# Patient Record
Sex: FEMALE | Race: WHITE | Employment: STUDENT | URBAN - METROPOLITAN AREA
[De-identification: names, ages, dates, MRNs, and addresses within clinical notes are randomized per-mention and may not be internally consistent; named-entity substitution may affect disease eponyms.]

---

## 2017-01-09 ENCOUNTER — OFFICE VISIT (OUTPATIENT)
Dept: SURGERY | Age: 32
End: 2017-01-09

## 2017-01-09 VITALS
OXYGEN SATURATION: 98 % | BODY MASS INDEX: 43.4 KG/M2 | HEART RATE: 87 BPM | HEIGHT: 69 IN | WEIGHT: 293 LBS | DIASTOLIC BLOOD PRESSURE: 76 MMHG | RESPIRATION RATE: 18 BRPM | SYSTOLIC BLOOD PRESSURE: 142 MMHG

## 2017-01-09 DIAGNOSIS — N39.3 SUI (STRESS URINARY INCONTINENCE, FEMALE): ICD-10-CM

## 2017-01-09 DIAGNOSIS — N92.6 IRREGULAR MENSES: ICD-10-CM

## 2017-01-09 DIAGNOSIS — E03.9 HYPOTHYROIDISM, UNSPECIFIED TYPE: ICD-10-CM

## 2017-01-09 DIAGNOSIS — M25.561 ARTHRALGIA OF BOTH KNEES: ICD-10-CM

## 2017-01-09 DIAGNOSIS — E66.01 MORBID OBESITY WITH BMI OF 50.0-59.9, ADULT (HCC): Primary | ICD-10-CM

## 2017-01-09 DIAGNOSIS — F41.9 ANXIETY: ICD-10-CM

## 2017-01-09 DIAGNOSIS — E66.01 MORBID OBESITY, UNSPECIFIED OBESITY TYPE (HCC): ICD-10-CM

## 2017-01-09 DIAGNOSIS — M25.562 ARTHRALGIA OF BOTH KNEES: ICD-10-CM

## 2017-01-09 DIAGNOSIS — R06.83 SNORES: ICD-10-CM

## 2017-01-09 RX ORDER — CITALOPRAM 40 MG/1
40 TABLET, FILM COATED ORAL EVERY EVENING
Status: ON HOLD | COMMUNITY
End: 2020-01-08

## 2017-01-09 NOTE — PROGRESS NOTES
Bariatric Surgery Consultation    Subjective: The patient is a 36 y.o. obese female with a Body mass index is 54.79 kg/(m^2). Giulia Umana The patient is currently her heaviest weight for the past 2 years. she has been overweight since childhood. she has been considering surgery since starting [facility] summer of 2016. she desires surgery at this time because of multiple health concerns and their lifestyle issues which are hindered by their weight. she has been referred by his family physician for evaluation and treatment of their obesity via surgical intervention. Randal Herman has tried multiple diets in her lifetime most recently tried physician supervised, behavior modification and unsupervised diets    Bariatric comorbidities present are   Patient Active Problem List   Diagnosis Code    Morbid obesity with BMI of 50.0-59.9, adult ([address] E66.01, Z68.43    Morbid obesity ([address] E66.01    Hypothyroidism E03.9    Anxiety F41.9    EVI (stress urinary incontinence, female) N39.3    Snores R06.83    Irregular menses N92.6    Arthritic-like pain M25.50       The patient is considering laparoscopic gastric bypass surgery for surgical weight loss due to their ineffective progress with medical forms of weight loss and the urging of their physician who cares for their primary medical issues. The patient  now presents  for consideration for weight loss surgery understanding the benefits of this over a medical approach of weight loss as was discussed in our presentation on weight loss surgery. They have discussed their plans both with their family and primary care physician who is in support of their pursuit of such. The patient has had no health issues as of late and denies and gastrointestinal disturbances other than what is outlined below in their review of symptoms.     The patients goal weight is 182 lb. (this represents a BMI of 27)    These goals are not typically consistent with expected outcomes of their desired operation and she understands that a multiyear dedication to diet and exercise are required to achieve this goal.  her Medical goals are resolution of these health issues. Patient Active Problem List    Diagnosis Date Noted    Morbid obesity with BMI of 50.0-59.9, adult ([address]     Morbid obesity ([facility] [address]     Hypothyroidism     Anxiety     EVI (stress urinary incontinence, female)     Snores     Irregular menses     Arthritic-like pain       Past Surgical History   Procedure Laterality Date    Hx cholecystectomy      Dilation and curettage       X 2 both for miscarriage      Social History   Substance Use Topics    Smoking status: Never Smoker    Smokeless tobacco: Not on file    Alcohol use Yes      Family History   Problem Relation Age of Onset    Kidney Disease Father     Cancer Father       Current Outpatient Prescriptions   Medication Sig Dispense Refill    citalopram (CELEXA) 40 mg tablet Take 40 mg by mouth daily.  LEVOTHYROXINE SODIUM (SYNTHROID PO) Take  by mouth.        No Known Allergies       Review of Systems:        General - No history or complaints of unexpected fever, chills, or weight loss  Head/Neck - No history or complaints of headache, diplopia, dysphagia, hearing loss  Cardiac - No history or complaints of chest pain, palpitations, murmur, or shortness of breath  Pulmonary - No history or complaints of shortness of breath, productive cough, hemoptysis  Gastrointestinal - no reflux,no  abdominal pain, obstipation/constipation or blood per rectum  Genitourinary - No history or complaints of hematuria/dysuria, or renal lithiasis  Musculoskeletal - moderate joint pain in their knee,  no muscular weakness  Hematologic - No history or complaints of bleeding disorders,  No blood transfusions  Neurologic - No history or complaints of  migraine headaches, seizure activity, syncopal episodes, TIA or stroke  Integumentary - No history or complaints of rashes, abnormal nevi, skin cancer  Gynecological - irregular menses    Objective:     Visit Vitals    /76 (BP 1 Location: Left arm, BP Patient Position: Sitting)    Pulse 87    Resp 18    Ht 5' 9\" (1.753 m)    Wt (!) 168.3 kg (371 lb)    SpO2 98%    Breastfeeding Unknown    BMI 54.79 kg/m2       Physical Examination: General appearance - alert, well appearing, and in no distress and oriented to person, place, and time  Mental status - alert, oriented to person, place, and time, normal mood, behavior, speech, dress, motor activity, and thought processes  Eyes - pupils equal and reactive, extraocular eye movements intact, sclera anicteric, left eye normal, right eye normal  Ears - bilateral TM's and external ear canals normal, right ear normal, left ear normal  Nose - normal and patent, no erythema, discharge or polyps and normal nontender sinuses  Mouth - mucous membranes moist, pharynx normal without lesions  Neck - supple, no significant adenopathy  Lymphatics - no palpable lymphadenopathy, no hepatosplenomegaly  Chest - clear to auscultation, no wheezes, rales or rhonchi, symmetric air entry  Heart - normal rate, regular rhythm, normal S1, S2, no murmurs, rubs, clicks or gallops  Abdomen - soft, nontender, nondistended, no masses or organomegaly  Back exam - full range of motion, no tenderness, palpable spasm or pain on motion  Neurological - alert, oriented, normal speech, no focal findings or movement disorder noted  Musculoskeletal - no joint tenderness, deformity or swelling  Extremities - peripheral pulses normal, no pedal edema, no clubbing or cyanosis  Skin - normal coloration and turgor, no rashes, no suspicious skin lesions noted    Labs:     No results found for this or any previous visit (from the past 1008 hour(s)). Assessment:     Morbid obesity with associated comorbidity    Plan:     laparoscopic gastric bypass surgery    This is a 36 y.o. female with a BMI of Body mass index is 54.79 kg/(m^2). and the weight-related co-morbidties of (as above). Viviana Hendricks meets the NIH criteria for bariatric surgery based upon the BMI of Body mass index is 54.79 kg/(m^2). and multiple weight-related co-morbidties. Viviana Hendricks has elected laparoscopic gastric bypass as her intervention of choice for treatment of morbid obestiy through surgical means secondary to its uniform results,  profound baseline suppression of hunger and pace at which weight is lost.    In the office today, following Eula's history and physical examination, a 30 minute discussion regarding the anatomic alterations for the laparoscopic gastric bypass  was undertaken. The dietary expectations and the patient  dependent factors for success were thoroughly discussed, to include the need for interval follow-up and long-term dietary changes associated with success. The possible short and long term  complications of the gastric bypass were also discussed, to include but not limited to;death, DVT/PE, staple line leak, bleeding, stricture formation, infection,internal hernia  and pouch dilation. Specific weight related outcomes for success were also discussed with an emphasis on careful and close follow-up with the first year and dietary behavior modification over the first years as baseline cyclical hunger returns  The patient expressed an understanding of the above factors, and her questions were answered in their entirety. In addition, the patient attended a 1.5 hour power point seminar regarding obesity, surgical weight loss including, adjustable gastric band, gastric bypass, and sleeve gastrectomy. This discussion contrasted the different surgical techniques, mechanisms of actions and expected outcomes, and surgical and medical risks associated with each procedure. During this seminar, there was a long question and answer session where each questions was answered until there were no additional questions.      Today, the patient had all of her questions answered and desires to proceed with  bariatric surgery initially choosing the gastric bypass as her surgical option. The patient understands the plan of action    She has completed her dietary visits via [employer] and has her psychological evaluation later this week    She takes no steroids and does not smoke    Secondary Diagnoses:     Dietary Intervention  - The patient is currently scheduled to see or has been followed by a bariatric nutritionist for an attempt at preoperative weight loss as has been dictated by their insurance carrier. They will be assessed at various times during their follow up to evaluate their progress depending on the length of time that is required once again by their carrier. I have explained the importance of preoperative weight loss and the benefits regarding lower surgical risk and also assisting the patient in reaching their weight loss goal.  Finally they understand their is a physiologic benefit from the standpoint of hepatic volume reduction preoperatively. I have reiterated the importance of a low carbohydrate and high protein regimen to achieve their stated goal.    Weight Related Arthritis -The patient understands the benefits that weight loss surgery can have on their arthritis but also understands that weight loss is not a guaranteed cure and relief of symptoms is often dependent on the severity of the underlying disease.  The patient also understands that traditional pharmaceutical treatments for this diagnosis are usually unavailable to post-operative weight loss patients due to the effects on the gastrointestinal tract particularly with the gastric bypass and to a lesser effect with the sleeve gastrectomy.  Any changes to the patients medication treatment will ultimately be made the patients PCP with input by our office.       Signed By: Dean Epstein MD     January 9, 2017

## 2017-01-09 NOTE — PATIENT INSTRUCTIONS
Body Mass Index: Care Instructions  Your Care Instructions    Body mass index (BMI) can help you see if your weight is raising your risk for health problems. It uses a formula to compare how much you weigh with how tall you are. A BMI between 18.5 and 24.9 is considered healthy. A BMI between 25 and 29.9 is considered overweight. A BMI of 30 or higher is considered obese. If your BMI is in the normal range, it means that you have a lower risk for weight-related health problems. If your BMI is in the overweight or obese range, you may be at increased risk for weight-related health problems, such as high blood pressure, heart disease, stroke, arthritis or joint pain, and diabetes. BMI is just one measure of your risk for weight-related health problems. You may be at higher risk for health problems if you are not active, you eat an unhealthy diet, or you drink too much alcohol or use tobacco products. Follow-up care is a key part of your treatment and safety. Be sure to make and go to all appointments, and call your doctor if you are having problems. It's also a good idea to know your test results and keep a list of the medicines you take. How can you care for yourself at home? · Practice healthy eating habits. This includes eating plenty of fruits, vegetables, whole grains, lean protein, and low-fat dairy. · Get at least 30 minutes of exercise 5 days a week or more. Brisk walking is a good choice. You also may want to do other activities, such as running, swimming, cycling, or playing tennis or team sports. · Do not smoke. Smoking can increase your risk for health problems. If you need help quitting, talk to your doctor about stop-smoking programs and medicines. These can increase your chances of quitting for good. · Limit alcohol to 2 drinks a day for men and 1 drink a day for women. Too much alcohol can cause health problems.   If you have a BMI higher than 25  · Your doctor may do other tests to check your risk for weight-related health problems. This may include measuring the distance around your waist. A waist measurement of more than 40 inches in men or 35 inches in women can increase the risk of weight-related health problems. · Talk with your doctor about steps you can take to stay healthy or improve your health. You may need to make lifestyle changes to lose weight and stay healthy, such as changing your diet and getting regular exercise. Where can you learn more? Go to http://cristina-melodie.info/. Enter S176 in the search box to learn more about \"Body Mass Index: Care Instructions. \"  Current as of: February 16, 2016  Content Version: 11.1  © 9429-9663 [employer]. Care instructions adapted under license by [employer] (which disclaims liability or warranty for this information). If you have questions about a medical condition or this instruction, always ask your healthcare professional. [place] [address] any warranty or liability for your use of this information. New patient Instructions      1. Ensure all pre-operative insurances requirements are complete (ie; dietary visits, psychology consults, primary care documentation, etc)    2. Adhere to pre-operative weight loss / weight maintenance plan discussed in the office today. 3. Contact the office with any questions on pre-operative clearance issues (ie; cardiology work-up, pulmonary work-up, upper GI study, etc). 4. If a barium upper GI study has been ordered for your evaluation, make sure you are on liquids only the morning of the procedure.

## 2017-01-20 RX ORDER — OXYCODONE AND ACETAMINOPHEN 5; 325 MG/1; MG/1
1 TABLET ORAL
Qty: 30 TAB | Refills: 0 | Status: SHIPPED | OUTPATIENT
Start: 2017-01-20 | End: 2017-02-15

## 2017-01-23 ENCOUNTER — NURSE NAVIGATOR (OUTPATIENT)
Dept: SURGERY | Age: 32
End: 2017-01-23

## 2017-01-23 ENCOUNTER — OFFICE VISIT (OUTPATIENT)
Dept: SURGERY | Age: 32
End: 2017-01-23

## 2017-01-23 ENCOUNTER — HOSPITAL ENCOUNTER (OUTPATIENT)
Dept: PREADMISSION TESTING | Age: 32
Discharge: HOME | End: 2017-01-23
Attending: SPECIALIST
Payer: MEDICAID

## 2017-01-23 VITALS
OXYGEN SATURATION: 98 % | RESPIRATION RATE: 16 BRPM | DIASTOLIC BLOOD PRESSURE: 74 MMHG | HEART RATE: 100 BPM | SYSTOLIC BLOOD PRESSURE: 127 MMHG | WEIGHT: 293 LBS | BODY MASS INDEX: 43.4 KG/M2 | HEIGHT: 69 IN

## 2017-01-23 DIAGNOSIS — E66.01 MORBID OBESITY WITH BMI OF 50.0-59.9, ADULT (HCC): Primary | ICD-10-CM

## 2017-01-23 DIAGNOSIS — E03.9 HYPOTHYROIDISM, UNSPECIFIED TYPE: ICD-10-CM

## 2017-01-23 DIAGNOSIS — R06.83 SNORES: ICD-10-CM

## 2017-01-23 DIAGNOSIS — E66.01 MORBID OBESITY, UNSPECIFIED OBESITY TYPE (HCC): ICD-10-CM

## 2017-01-23 DIAGNOSIS — N39.3 SUI (STRESS URINARY INCONTINENCE, FEMALE): ICD-10-CM

## 2017-01-23 DIAGNOSIS — N92.6 IRREGULAR MENSES: ICD-10-CM

## 2017-01-23 DIAGNOSIS — M25.561 ARTHRALGIA OF BOTH KNEES: ICD-10-CM

## 2017-01-23 DIAGNOSIS — M25.562 ARTHRALGIA OF BOTH KNEES: ICD-10-CM

## 2017-01-23 DIAGNOSIS — F41.9 ANXIETY: ICD-10-CM

## 2017-01-23 LAB
ABO + RH BLD: NORMAL
ALBUMIN SERPL BCP-MCNC: 3.7 G/DL (ref 3.4–5)
ALBUMIN/GLOB SERPL: 0.9 {RATIO} (ref 0.8–1.7)
ALP SERPL-CCNC: 45 U/L (ref 45–117)
ALT SERPL-CCNC: 24 U/L (ref 13–56)
ANION GAP BLD CALC-SCNC: 9 MMOL/L (ref 3–18)
AST SERPL W P-5'-P-CCNC: 16 U/L (ref 15–37)
ATRIAL RATE: 66 BPM
BASOPHILS # BLD AUTO: 0 K/UL (ref 0–0.06)
BASOPHILS # BLD: 0 % (ref 0–2)
BILIRUB SERPL-MCNC: 0.3 MG/DL (ref 0.2–1)
BLOOD GROUP ANTIBODIES SERPL: NORMAL
BUN SERPL-MCNC: 11 MG/DL (ref 7–18)
BUN/CREAT SERPL: 15 (ref 12–20)
CALCIUM SERPL-MCNC: 8.8 MG/DL (ref 8.5–10.1)
CALCULATED P AXIS, ECG09: 63 DEGREES
CALCULATED R AXIS, ECG10: 74 DEGREES
CALCULATED T AXIS, ECG11: 57 DEGREES
CHLORIDE SERPL-SCNC: 103 MMOL/L (ref 100–108)
CO2 SERPL-SCNC: 28 MMOL/L (ref 21–32)
CREAT SERPL-MCNC: 0.75 MG/DL (ref 0.6–1.3)
DIAGNOSIS, 93000: NORMAL
DIFFERENTIAL METHOD BLD: NORMAL
EOSINOPHIL # BLD: 0.2 K/UL (ref 0–0.4)
EOSINOPHIL NFR BLD: 2 % (ref 0–5)
ERYTHROCYTE [DISTWIDTH] IN BLOOD BY AUTOMATED COUNT: 13.4 % (ref 11.6–14.5)
GLOBULIN SER CALC-MCNC: 3.9 G/DL (ref 2–4)
GLUCOSE SERPL-MCNC: 84 MG/DL (ref 74–99)
HCT VFR BLD AUTO: 40.7 % (ref 35–45)
HGB BLD-MCNC: 13.3 G/DL (ref 12–16)
LYMPHOCYTES # BLD AUTO: 34 % (ref 21–52)
LYMPHOCYTES # BLD: 3.4 K/UL (ref 0.9–3.6)
MCH RBC QN AUTO: 28 PG (ref 24–34)
MCHC RBC AUTO-ENTMCNC: 32.7 G/DL (ref 31–37)
MCV RBC AUTO: 85.7 FL (ref 74–97)
MONOCYTES # BLD: 0.9 K/UL (ref 0.05–1.2)
MONOCYTES NFR BLD AUTO: 9 % (ref 3–10)
NEUTS SEG # BLD: 5.6 K/UL (ref 1.8–8)
NEUTS SEG NFR BLD AUTO: 55 % (ref 40–73)
P-R INTERVAL, ECG05: 168 MS
PLATELET # BLD AUTO: 292 K/UL (ref 135–420)
PMV BLD AUTO: 9.9 FL (ref 9.2–11.8)
POTASSIUM SERPL-SCNC: 3.9 MMOL/L (ref 3.5–5.5)
PROT SERPL-MCNC: 7.6 G/DL (ref 6.4–8.2)
Q-T INTERVAL, ECG07: 426 MS
QRS DURATION, ECG06: 92 MS
QTC CALCULATION (BEZET), ECG08: 446 MS
RBC # BLD AUTO: 4.75 M/UL (ref 4.2–5.3)
SODIUM SERPL-SCNC: 140 MMOL/L (ref 136–145)
SPECIMEN EXP DATE BLD: NORMAL
VENTRICULAR RATE, ECG03: 66 BPM
WBC # BLD AUTO: 10.2 K/UL (ref 4.6–13.2)

## 2017-01-23 PROCEDURE — 93005 ELECTROCARDIOGRAM TRACING: CPT

## 2017-01-23 PROCEDURE — 85025 COMPLETE CBC W/AUTO DIFF WBC: CPT | Performed by: SPECIALIST

## 2017-01-23 PROCEDURE — 36415 COLL VENOUS BLD VENIPUNCTURE: CPT | Performed by: SPECIALIST

## 2017-01-23 PROCEDURE — 80053 COMPREHEN METABOLIC PANEL: CPT | Performed by: SPECIALIST

## 2017-01-23 PROCEDURE — 86900 BLOOD TYPING SEROLOGIC ABO: CPT | Performed by: SPECIALIST

## 2017-01-23 NOTE — PROGRESS NOTES
Appears to have understanding of surgical procedure, pre-op and post-op risks, and lifestyle changes. Asked appropriate questions.

## 2017-01-23 NOTE — PROGRESS NOTES
Gastric Bypass - History and Physical    Subjective: The patient is a 36 y.o. obese female with a Body mass index is 55.08 kg/(m^2). .   she presents now to review their work up to date   to see if they are a candidate for surgery and whether or not to proceed with the previously requested procedure. Bariatric comorbidities continue to include:   Patient Active Problem List   Diagnosis Code    Morbid obesity with BMI of 50.0-59.9, adult ([facility] [address] E66.01, Z68.43    Morbid obesity ([facility] [address] E66.01    Hypothyroidism E03.9    Anxiety F41.9    EVI (stress urinary incontinence, female) N39.3    Snores R06.83    Irregular menses N92.6    Arthritic-like pain M25.50    Hypertension I10       They have been generally well prior to this visit and have had no recent significant illnesses. The patient has had no gastrointestinal issues   that would preclude them from proceeding with the surgery they have chosen. Veda Hannah has recently tried a preoperative weight loss   program  in addition to seeing a bariatric nutritionist preoperatively. We have discussed on at least one other occasion about the various types   of surgical weight loss procedures and they have considered these options after our initial consultation. We have once again discussed these   procedures in detail and they have now decided on a surgical procedure. They present today to discuss this and confirm that their evaluation   pre operatively is acceptable to continue with surgery. The patient desires laparoscopic gastric bypass surgery for surgical weight loss. The patients goal weight is 182 lb. (this represents a BMI of 27)    These goals are not typically consistent with expected outcomes of their desired operation and she understands that a multiyear dedication to   diet and exercise are required to achieve this goal.  her Medical goals are resolution of these health issues.     Patient Active Problem List    Diagnosis Date Noted    Hypertension     Morbid obesity with BMI of 50.0-59.9, adult ([facility] [address]     Morbid obesity ([facility] [address]     Hypothyroidism     Anxiety     EVI (stress urinary incontinence, female)     Snores     Irregular menses     Arthritic-like pain       Past Surgical History   Procedure Laterality Date    Dilation and curettage       X 2 both for miscarriage    Hx lap cholecystectomy        Social History   Substance Use Topics    Smoking status: Never Smoker    Smokeless tobacco: Never Used    Alcohol use Yes      Comment: monthly      Family History   Problem Relation Age of Onset    Kidney Disease Father     Cancer Father       Current Outpatient Prescriptions   Medication Sig Dispense Refill    oxyCODONE-acetaminophen (PERCOCET) 5-325 mg per tablet Take 1 Tab by mouth every four (4) hours as needed for Pain. Max Daily Amount: 6 Tabs. 30 Tab 0    citalopram (CELEXA) 40 mg tablet Take 40 mg by mouth every evening.  LEVOTHYROXINE SODIUM (SYNTHROID PO) Take  by mouth.        No Known Allergies       Review of Systems:        General - No history or complaints of unexpected fever, chills, or weight loss  Head/Neck - No history or complaints of headache, diplopia, dysphagia, hearing loss  Cardiac - No history or complaints of chest pain, palpitations, murmur, or shortness of breath  Pulmonary - No history or complaints of shortness of breath, productive cough, hemoptysis  Gastrointestinal - no reflux,no abdominal pain, obstipation/constipation or blood per rectum  Genitourinary - No history or complaints of hematuria/dysuria, or renal lithiasis  Musculoskeletal - moderate joint pain in their knee, no muscular weakness  Hematologic - No history or complaints of bleeding disorders, No blood transfusions  Neurologic - No history or complaints of migraine headaches, seizure activity, syncopal episodes, TIA or stroke  Integumentary - No history or complaints of rashes, abnormal nevi, skin cancer  Gynecological - irregular menses    Objective:     Visit Vitals    /74 (BP 1 Location: Left arm, BP Patient Position: Sitting)    Pulse 100    Resp 16    Ht 5' 9\" (1.753 m)    Wt (!) 169.2 kg (373 lb)    LMP 01/02/2017 (Approximate)    SpO2 98%    BMI 55.08 kg/m2        Physical Examination: General appearance - alert, well appearing, and in no distress and oriented to person, place, and time  Mental status - alert, oriented to person, place, and time, normal mood, behavior, speech, dress, motor activity, and thought processes  Eyes - pupils equal and reactive, extraocular eye movements intact, sclera anicteric, left eye normal, right eye normal  Ears - bilateral TM's and external ear canals normal, right ear normal, left ear normal  Nose - normal and patent, no erythema, discharge or polyps and normal nontender sinuses  Mouth - mucous membranes moist, pharynx normal without lesions  Neck - supple, no significant adenopathy  Lymphatics - no palpable lymphadenopathy, no hepatosplenomegaly  Chest - clear to auscultation, no wheezes, rales or rhonchi, symmetric air entry  Heart - normal rate, regular rhythm, normal S1, S2, no murmurs, rubs, clicks or gallops  Abdomen - soft, nontender, nondistended, no masses or organomegaly  Back exam - full range of motion, no tenderness, palpable spasm or pain on motion  Neurological - alert, oriented, normal speech, no focal findings or movement disorder noted  Musculoskeletal - no joint tenderness, deformity or swelling  Extremities - peripheral pulses normal, no pedal edema, no clubbing or cyanosis  Skin - normal coloration and turgor, no rashes, no suspicious skin lesions noted    Labs / Preoperative Evaluations:     Recent Results (from the past 1008 hour(s))   EKG, 12 LEAD, INITIAL    Collection Time: 01/23/17 12:05 PM   Result Value Ref Range    Ventricular Rate 66 BPM    Atrial Rate 66 BPM    P-R Interval 168 ms    QRS Duration 92 ms    Q-T Interval 426 ms    QTC Calculation (Odin) 441 ms    Calculated P Axis 63 degrees    Calculated R Axis 74 degrees    Calculated T Axis 57 degrees    Diagnosis       Normal sinus rhythm  Normal ECG  No previous ECGs available     CBC WITH AUTOMATED DIFF    Collection Time: 01/23/17 12:17 PM   Result Value Ref Range    WBC 10.2 4.6 - 13.2 K/uL    RBC 4.75 4.20 - 5.30 M/uL    HGB 13.3 12.0 - 16.0 g/dL    HCT 40.7 35.0 - 45.0 %    MCV 85.7 74.0 - 97.0 FL    MCH 28.0 24.0 - 34.0 PG    MCHC 32.7 31.0 - 37.0 g/dL    RDW 13.4 11.6 - 14.5 %    PLATELET 043 149 - 592 K/uL    MPV 9.9 9.2 - 11.8 FL    NEUTROPHILS 55 40 - 73 %    LYMPHOCYTES 34 21 - 52 %    MONOCYTES 9 3 - 10 %    EOSINOPHILS 2 0 - 5 %    BASOPHILS 0 0 - 2 %    ABS. NEUTROPHILS 5.6 1.8 - 8.0 K/UL    ABS. LYMPHOCYTES 3.4 0.9 - 3.6 K/UL    ABS. MONOCYTES 0.9 0.05 - 1.2 K/UL    ABS. EOSINOPHILS 0.2 0.0 - 0.4 K/UL    ABS. BASOPHILS 0.0 0.0 - 0.06 K/UL    DF AUTOMATED     METABOLIC PANEL, COMPREHENSIVE    Collection Time: 01/23/17 12:17 PM   Result Value Ref Range    Sodium 140 136 - 145 mmol/L    Potassium 3.9 3.5 - 5.5 mmol/L    Chloride 103 100 - 108 mmol/L    CO2 28 21 - 32 mmol/L    Anion gap 9 3.0 - 18 mmol/L    Glucose 84 74 - 99 mg/dL    BUN 11 7.0 - 18 MG/DL    Creatinine 0.75 0.6 - 1.3 MG/DL    BUN/Creatinine ratio 15 12 - 20      GFR est AA >60 >60 ml/min/1.73m2    GFR est non-AA >60 >60 ml/min/1.73m2    Calcium 8.8 8.5 - 10.1 MG/DL    Bilirubin, total 0.3 0.2 - 1.0 MG/DL    ALT 24 13 - 56 U/L    AST 16 15 - 37 U/L    Alk.  phosphatase 45 45 - 117 U/L    Protein, total 7.6 6.4 - 8.2 g/dL    Albumin 3.7 3.4 - 5.0 g/dL    Globulin 3.9 2.0 - 4.0 g/dL    A-G Ratio 0.9 0.8 - 1.7     TYPE & SCREEN    Collection Time: 01/23/17 12:18 PM   Result Value Ref Range    Crossmatch Expiration 01/29/2017     ABO/Rh(D) PENDING     Antibody screen PENDING        Assessment:     Morbid obesity with associated comorbidity    Plan:     laparoscopic gastric bypass surgery    This is a 36 y.o. female with a BMI of Body mass index is 55.08 kg/(m^2). and the weight-related co-morbidties as noted above. Clerance Edgarton meets the NIH criteria for bariatric surgery based upon the BMI of Body mass index is 55.08 kg/(m^2). and multiple weight-related co-morbidties. Jason Layne has elected laparoscopic gastric bypass as her intervention of choice for treatment of morbid obestiy through surgical means secondary to its uniform results,  profound baseline suppression of hunger and pace at which weight is lost.    In the office today, following Eula's history and physical examination, a 40 minute discussion regarding the anatomic alterations for the laparoscopic gastric bypass  was undertaken. The dietary expectations and the patient  dependent factors for success were thoroughly discussed, to include the need for interval follow-up and long-term dietary changes associated with success. The possible short and long term  complications of the gastric bypass were also discussed, to include but not limited to;death, DVT/PE, staple line leak, bleeding, stricture formation, infection,internal hernia  and pouch dilation. Specific weight related outcomes for success were also discussed with an emphasis on careful and close follow-up with the first year and Dietary behavior modification over the first years as baseline cyclical hunger returns  The patient expressed an understanding of the above factors, and her questions were answered in their entirety. In addition, the patient attended a 1.5 hour power point seminar regarding obesity, surgical weight loss including, adjustable gastric band, gastric bypass, and sleeve gastrectomy. This discussion contrasted the different surgical techniques, mechanisms of actions and expected outcomes, and surgical and medical risks associated with each procedure. During this seminar, there was a long question and answer session where each questions was answered until there were no additional questions.      Today, the patient had all of her questions answered and the decision was made today that the patient's preoperative evaluation is acceptable for them  to proceed with bariatric surgery  choosing adjustable gastric bypass as her surgical option. The patient understands the plan of action      Secondary Diagnoses:     DVT / Pulmonary Embolus Risk - The patient is at a higher risk for post operative DVT / pulmonary embolus secondary to their morbid obese status, relative sedentary lifestyle, and impending general anesthetic. We will plan to use anticoagulation therapy pre and post operative as well as  pneumatic compression devices and encourage ambulation once on the hospital nursing floor. The need for possible at home anticoagulation therapy has also been discussed and any decision on this matter will be made during post operative evaluations. The patient understands that their efforts at ambulation are of vital importance to reduce the risk of this complication thus placing significant burden on them as to the prevention of such issues. Signs and symptoms of DVT / PE have been discussed with the patient and they have been instructed to call the office if any these occur in the \"at home\" post op phase. Weight Related Arthritis -The patient understands the benefits that weight loss surgery can have on their arthritis but also understands that weight loss is not a guaranteed cure and relief of symptoms is often dependent on the severity of the underlying disease.  The patient also understands that traditional pharmaceutical treatments for this diagnosis are usually unavailable to post-operative weight loss patients due to the effects on the gastrointestinal tract particularly with the gastric bypass and to a lesser effect with the sleeve gastrectomy.  Any changes to the patients medication treatment will ultimately be made the patients PCP with input by our office.           Signed By: Steve German MD     January 23, 2017

## 2017-01-23 NOTE — PATIENT INSTRUCTIONS
Body Mass Index: Care Instructions  Your Care Instructions    Body mass index (BMI) can help you see if your weight is raising your risk for health problems. It uses a formula to compare how much you weigh with how tall you are. A BMI between 18.5 and 24.9 is considered healthy. A BMI between 25 and 29.9 is considered overweight. A BMI of 30 or higher is considered obese. If your BMI is in the normal range, it means that you have a lower risk for weight-related health problems. If your BMI is in the overweight or obese range, you may be at increased risk for weight-related health problems, such as high blood pressure, heart disease, stroke, arthritis or joint pain, and diabetes. BMI is just one measure of your risk for weight-related health problems. You may be at higher risk for health problems if you are not active, you eat an unhealthy diet, or you drink too much alcohol or use tobacco products. Follow-up care is a key part of your treatment and safety. Be sure to make and go to all appointments, and call your doctor if you are having problems. It's also a good idea to know your test results and keep a list of the medicines you take. How can you care for yourself at home? · Practice healthy eating habits. This includes eating plenty of fruits, vegetables, whole grains, lean protein, and low-fat dairy. · Get at least 30 minutes of exercise 5 days a week or more. Brisk walking is a good choice. You also may want to do other activities, such as running, swimming, cycling, or playing tennis or team sports. · Do not smoke. Smoking can increase your risk for health problems. If you need help quitting, talk to your doctor about stop-smoking programs and medicines. These can increase your chances of quitting for good. · Limit alcohol to 2 drinks a day for men and 1 drink a day for women. Too much alcohol can cause health problems.   If you have a BMI higher than 25  · Your doctor may do other tests to check your risk for weight-related health problems. This may include measuring the distance around your waist. A waist measurement of more than 40 inches in men or 35 inches in women can increase the risk of weight-related health problems. · Talk with your doctor about steps you can take to stay healthy or improve your health. You may need to make lifestyle changes to lose weight and stay healthy, such as changing your diet and getting regular exercise. Where can you learn more? Go to http://cristina-melodie.info/. Enter S176 in the search box to learn more about \"Body Mass Index: Care Instructions. \"  Current as of: February 16, 2016  Content Version: 11.1  © 1277-5578 [employer]. Care instructions adapted under license by [employer] (which disclaims liability or warranty for this information). If you have questions about a medical condition or this instruction, always ask your healthcare professional. [place] [address] any warranty or liability for your use of this information. Preparation for Surgery  Refer to your book for specific instructions    1. Stop taking all aspirin products, ibuprofen products, non-steroidal medications, blood thinners,  and herbal supplements as outlined in your book. 2. Absolutely no smoking. 3. If diabetic, monitor blood sugars regularly and alert the office of blood sugars over 200.    4. Have a supply of protein product and liquid diet items for your first two weeks as outlined in your book. 5. The day before your surgery is scheduled:  6.    Gastric Bypass and Sleeve:  Clear liquids and Protein Shakes     Gastric Band:   Eat lightly. No snacking.  Drink lots of water         6. Get prepared to meet a new you!

## 2017-01-23 NOTE — PROGRESS NOTES
Appears to have a good understanding of the diet progression, food choices, and dietary/exercise habits for successful weight loss and nourishment after surgery. The class material included: post-op diet progression, including liquid, pureed, and low fat, low sugar food recommendations; proper food group choices, and encouraging dietary and exercise habits that lead to weight loss success.      Marco Gaucher, RD

## 2017-01-26 ENCOUNTER — ANESTHESIA EVENT (OUTPATIENT)
Dept: SURGERY | Age: 32
DRG: 403 | End: 2017-01-26
Payer: MEDICAID

## 2017-01-26 ENCOUNTER — ANESTHESIA (OUTPATIENT)
Dept: SURGERY | Age: 32
DRG: 403 | End: 2017-01-26
Payer: MEDICAID

## 2017-01-26 ENCOUNTER — HOSPITAL ENCOUNTER (INPATIENT)
Age: 32
LOS: 1 days | Discharge: HOME | DRG: 403 | End: 2017-01-27
Attending: SPECIALIST | Admitting: SPECIALIST
Payer: MEDICAID

## 2017-01-26 PROBLEM — E66.01 MORBID OBESITY WITH BODY MASS INDEX OF 50.0-59.9 IN ADULT (HCC): Status: ACTIVE | Noted: 2017-01-26

## 2017-01-26 LAB
BASOPHILS # BLD AUTO: 0 K/UL (ref 0–0.06)
BASOPHILS # BLD: 0 % (ref 0–2)
DIFFERENTIAL METHOD BLD: ABNORMAL
EOSINOPHIL # BLD: 0 K/UL (ref 0–0.4)
EOSINOPHIL NFR BLD: 0 % (ref 0–5)
ERYTHROCYTE [DISTWIDTH] IN BLOOD BY AUTOMATED COUNT: 13.2 % (ref 11.6–14.5)
HCG SERPL QL: NEGATIVE
HCT VFR BLD AUTO: 35.5 % (ref 35–45)
HGB BLD-MCNC: 11.8 G/DL (ref 12–16)
LYMPHOCYTES # BLD AUTO: 20 % (ref 21–52)
LYMPHOCYTES # BLD: 2.3 K/UL (ref 0.9–3.6)
MCH RBC QN AUTO: 28.2 PG (ref 24–34)
MCHC RBC AUTO-ENTMCNC: 33.2 G/DL (ref 31–37)
MCV RBC AUTO: 84.9 FL (ref 74–97)
MONOCYTES # BLD: 0.9 K/UL (ref 0.05–1.2)
MONOCYTES NFR BLD AUTO: 7 % (ref 3–10)
NEUTS SEG # BLD: 8.6 K/UL (ref 1.8–8)
NEUTS SEG NFR BLD AUTO: 73 % (ref 40–73)
PLATELET # BLD AUTO: 220 K/UL (ref 135–420)
PMV BLD AUTO: 9.4 FL (ref 9.2–11.8)
RBC # BLD AUTO: 4.18 M/UL (ref 4.2–5.3)
WBC # BLD AUTO: 11.9 K/UL (ref 4.6–13.2)

## 2017-01-26 PROCEDURE — 88307 TISSUE EXAM BY PATHOLOGIST: CPT | Performed by: SPECIALIST

## 2017-01-26 PROCEDURE — 0DB68ZX EXCISION OF STOMACH, VIA NATURAL OR ARTIFICIAL OPENING ENDOSCOPIC, DIAGNOSTIC: ICD-10-PCS | Performed by: SPECIALIST

## 2017-01-26 PROCEDURE — 88305 TISSUE EXAM BY PATHOLOGIST: CPT | Performed by: SPECIALIST

## 2017-01-26 PROCEDURE — 74011250636 HC RX REV CODE- 250/636

## 2017-01-26 PROCEDURE — 77030020782 HC GWN BAIR PAWS FLX 3M -B: Performed by: SPECIALIST

## 2017-01-26 PROCEDURE — 77030008603 HC TRCR ENDOSC EPATH J&J -C: Performed by: SPECIALIST

## 2017-01-26 PROCEDURE — 76010000131 HC OR TIME 2 TO 2.5 HR: Performed by: SPECIALIST

## 2017-01-26 PROCEDURE — 77030011814: Performed by: SPECIALIST

## 2017-01-26 PROCEDURE — 77030020255 HC SOL INJ LR 1000ML BG: Performed by: SPECIALIST

## 2017-01-26 PROCEDURE — 77030008683 HC TU ET CUF COVD -A: Performed by: NURSE ANESTHETIST, CERTIFIED REGISTERED

## 2017-01-26 PROCEDURE — 74011250636 HC RX REV CODE- 250/636: Performed by: SPECIALIST

## 2017-01-26 PROCEDURE — 77030034850: Performed by: SPECIALIST

## 2017-01-26 PROCEDURE — 77010033678 HC OXYGEN DAILY

## 2017-01-26 PROCEDURE — 77030013567 HC DRN WND RESERV BARD -A: Performed by: SPECIALIST

## 2017-01-26 PROCEDURE — 77030018004 HC RELD STPLR ENDOSC1 J&J -C: Performed by: SPECIALIST

## 2017-01-26 PROCEDURE — 77030022585 HC SEAL FBRN EVICEL J&J -F: Performed by: SPECIALIST

## 2017-01-26 PROCEDURE — 77030011810 HC STPLR ENDOSC J&J -G: Performed by: SPECIALIST

## 2017-01-26 PROCEDURE — 77030002986 HC SUT PROL J&J -A: Performed by: SPECIALIST

## 2017-01-26 PROCEDURE — 65270000029 HC RM PRIVATE

## 2017-01-26 PROCEDURE — 85025 COMPLETE CBC W/AUTO DIFF WBC: CPT | Performed by: SPECIALIST

## 2017-01-26 PROCEDURE — 77030011640 HC PAD GRND REM COVD -A: Performed by: SPECIALIST

## 2017-01-26 PROCEDURE — 74011250636 HC RX REV CODE- 250/636: Performed by: ANESTHESIOLOGY

## 2017-01-26 PROCEDURE — 77030012893: Performed by: SPECIALIST

## 2017-01-26 PROCEDURE — 77030034154 HC SHR COAG HARM ACE J&J -F: Performed by: SPECIALIST

## 2017-01-26 PROCEDURE — 77030002896 HC SUT CLP ABSRB J&J -B: Performed by: SPECIALIST

## 2017-01-26 PROCEDURE — 77030012407 HC DRN WND BARD -B: Performed by: SPECIALIST

## 2017-01-26 PROCEDURE — 77030016151 HC PROTCTR LNS DFOG COVD -B: Performed by: SPECIALIST

## 2017-01-26 PROCEDURE — 36415 COLL VENOUS BLD VENIPUNCTURE: CPT | Performed by: SPECIALIST

## 2017-01-26 PROCEDURE — 74011000250 HC RX REV CODE- 250: Performed by: SPECIALIST

## 2017-01-26 PROCEDURE — 77030006643: Performed by: NURSE ANESTHETIST, CERTIFIED REGISTERED

## 2017-01-26 PROCEDURE — 77030010030: Performed by: SPECIALIST

## 2017-01-26 PROCEDURE — 77030002935 HC SUT MCRYL J&J -C: Performed by: SPECIALIST

## 2017-01-26 PROCEDURE — 77030032490 HC SLV COMPR SCD KNE COVD -B: Performed by: SPECIALIST

## 2017-01-26 PROCEDURE — 76210000017 HC OR PH I REC 1.5 TO 2 HR: Performed by: SPECIALIST

## 2017-01-26 PROCEDURE — 77030027876 HC STPLR ENDOSC FLX PWR J&J -G1: Performed by: SPECIALIST

## 2017-01-26 PROCEDURE — 77030009851 HC PCH RTVR ENDOSC AMR -B: Performed by: SPECIALIST

## 2017-01-26 PROCEDURE — 77030005264 HC CATH JEJU BKR BARD -D: Performed by: SPECIALIST

## 2017-01-26 PROCEDURE — 77030008477 HC STYL SATN SLP COVD -A: Performed by: NURSE ANESTHETIST, CERTIFIED REGISTERED

## 2017-01-26 PROCEDURE — 77030003580 HC NDL INSUF VERES J&J -B: Performed by: SPECIALIST

## 2017-01-26 PROCEDURE — 0D164ZA BYPASS STOMACH TO JEJUNUM, PERCUTANEOUS ENDOSCOPIC APPROACH: ICD-10-PCS | Performed by: SPECIALIST

## 2017-01-26 PROCEDURE — 77030033271 HC TRCR ENDOSC EPATH2 J&J -B: Performed by: SPECIALIST

## 2017-01-26 PROCEDURE — 77030009426 HC FCPS BIOP ENDOSC BSC -B: Performed by: SPECIALIST

## 2017-01-26 PROCEDURE — 77030002912 HC SUT ETHBND J&J -A: Performed by: SPECIALIST

## 2017-01-26 PROCEDURE — 77030036732 HC RELD STPLR VASC J&J -F: Performed by: SPECIALIST

## 2017-01-26 PROCEDURE — 84703 CHORIONIC GONADOTROPIN ASSAY: CPT | Performed by: SPECIALIST

## 2017-01-26 PROCEDURE — 77030002916 HC SUT ETHLN J&J -A: Performed by: SPECIALIST

## 2017-01-26 PROCEDURE — 76060000035 HC ANESTHESIA 2 TO 2.5 HR: Performed by: SPECIALIST

## 2017-01-26 PROCEDURE — 77030036598 HC CARTDRG STPL RELD ECHELON FLX J&J -D: Performed by: SPECIALIST

## 2017-01-26 PROCEDURE — 88342 IMHCHEM/IMCYTCHM 1ST ANTB: CPT | Performed by: SPECIALIST

## 2017-01-26 PROCEDURE — 77030010515 HC APPL ENDOCLP LIG J&J -B: Performed by: SPECIALIST

## 2017-01-26 PROCEDURE — 74011000250 HC RX REV CODE- 250

## 2017-01-26 PROCEDURE — 0FB04ZX EXCISION OF LIVER, PERCUTANEOUS ENDOSCOPIC APPROACH, DIAGNOSTIC: ICD-10-PCS | Performed by: SPECIALIST

## 2017-01-26 PROCEDURE — 88313 SPECIAL STAINS GROUP 2: CPT | Performed by: SPECIALIST

## 2017-01-26 PROCEDURE — 77030002966 HC SUT PDS J&J -A: Performed by: SPECIALIST

## 2017-01-26 PROCEDURE — 77030014008 HC SPNG HEMSTAT J&J -C: Performed by: SPECIALIST

## 2017-01-26 PROCEDURE — 77030018836 HC SOL IRR NACL ICUM -A: Performed by: SPECIALIST

## 2017-01-26 RX ORDER — NYSTATIN 100000 [USP'U]/ML
500000 SUSPENSION ORAL
Status: DISCONTINUED | OUTPATIENT
Start: 2017-01-26 | End: 2017-01-26 | Stop reason: HOSPADM

## 2017-01-26 RX ORDER — ONDANSETRON 2 MG/ML
INJECTION INTRAMUSCULAR; INTRAVENOUS AS NEEDED
Status: DISCONTINUED | OUTPATIENT
Start: 2017-01-26 | End: 2017-01-26 | Stop reason: HOSPADM

## 2017-01-26 RX ORDER — SODIUM CHLORIDE 0.9 % (FLUSH) 0.9 %
5-10 SYRINGE (ML) INJECTION AS NEEDED
Status: DISCONTINUED | OUTPATIENT
Start: 2017-01-26 | End: 2017-01-26 | Stop reason: HOSPADM

## 2017-01-26 RX ORDER — KETOROLAC TROMETHAMINE 30 MG/ML
30 INJECTION, SOLUTION INTRAMUSCULAR; INTRAVENOUS EVERY 6 HOURS
Status: DISCONTINUED | OUTPATIENT
Start: 2017-01-26 | End: 2017-01-27 | Stop reason: HOSPADM

## 2017-01-26 RX ORDER — HYDROMORPHONE HYDROCHLORIDE 2 MG/ML
INJECTION, SOLUTION INTRAMUSCULAR; INTRAVENOUS; SUBCUTANEOUS AS NEEDED
Status: DISCONTINUED | OUTPATIENT
Start: 2017-01-26 | End: 2017-01-26 | Stop reason: HOSPADM

## 2017-01-26 RX ORDER — SODIUM CHLORIDE, SODIUM LACTATE, POTASSIUM CHLORIDE, CALCIUM CHLORIDE 600; 310; 30; 20 MG/100ML; MG/100ML; MG/100ML; MG/100ML
150 INJECTION, SOLUTION INTRAVENOUS CONTINUOUS
Status: DISCONTINUED | OUTPATIENT
Start: 2017-01-26 | End: 2017-01-27 | Stop reason: HOSPADM

## 2017-01-26 RX ORDER — GLYCOPYRROLATE 0.2 MG/ML
INJECTION INTRAMUSCULAR; INTRAVENOUS AS NEEDED
Status: DISCONTINUED | OUTPATIENT
Start: 2017-01-26 | End: 2017-01-26 | Stop reason: HOSPADM

## 2017-01-26 RX ORDER — NEOSTIGMINE METHYLSULFATE 5 MG/5 ML
SYRINGE (ML) INTRAVENOUS AS NEEDED
Status: DISCONTINUED | OUTPATIENT
Start: 2017-01-26 | End: 2017-01-26 | Stop reason: HOSPADM

## 2017-01-26 RX ORDER — ONDANSETRON 2 MG/ML
4 INJECTION INTRAMUSCULAR; INTRAVENOUS
Status: DISCONTINUED | OUTPATIENT
Start: 2017-01-26 | End: 2017-01-27 | Stop reason: HOSPADM

## 2017-01-26 RX ORDER — HYDROMORPHONE HYDROCHLORIDE 1 MG/ML
1 INJECTION, SOLUTION INTRAMUSCULAR; INTRAVENOUS; SUBCUTANEOUS
Status: DISCONTINUED | OUTPATIENT
Start: 2017-01-26 | End: 2017-01-27

## 2017-01-26 RX ORDER — FLUMAZENIL 0.1 MG/ML
0.2 INJECTION INTRAVENOUS
Status: DISCONTINUED | OUTPATIENT
Start: 2017-01-26 | End: 2017-01-26 | Stop reason: HOSPADM

## 2017-01-26 RX ORDER — NALOXONE HYDROCHLORIDE 0.4 MG/ML
0.4 INJECTION, SOLUTION INTRAMUSCULAR; INTRAVENOUS; SUBCUTANEOUS AS NEEDED
Status: DISCONTINUED | OUTPATIENT
Start: 2017-01-26 | End: 2017-01-26 | Stop reason: HOSPADM

## 2017-01-26 RX ORDER — PROPOFOL 10 MG/ML
INJECTION, EMULSION INTRAVENOUS AS NEEDED
Status: DISCONTINUED | OUTPATIENT
Start: 2017-01-26 | End: 2017-01-26 | Stop reason: HOSPADM

## 2017-01-26 RX ORDER — ACETAMINOPHEN 10 MG/ML
1000 INJECTION, SOLUTION INTRAVENOUS ONCE
Status: COMPLETED | OUTPATIENT
Start: 2017-01-26 | End: 2017-01-26

## 2017-01-26 RX ORDER — BUPIVACAINE HYDROCHLORIDE AND EPINEPHRINE 2.5; 5 MG/ML; UG/ML
INJECTION, SOLUTION EPIDURAL; INFILTRATION; INTRACAUDAL; PERINEURAL AS NEEDED
Status: DISCONTINUED | OUTPATIENT
Start: 2017-01-26 | End: 2017-01-26 | Stop reason: HOSPADM

## 2017-01-26 RX ORDER — FENTANYL CITRATE 50 UG/ML
50 INJECTION, SOLUTION INTRAMUSCULAR; INTRAVENOUS
Status: COMPLETED | OUTPATIENT
Start: 2017-01-26 | End: 2017-01-26

## 2017-01-26 RX ORDER — DIPHENHYDRAMINE HYDROCHLORIDE 50 MG/ML
25 INJECTION, SOLUTION INTRAMUSCULAR; INTRAVENOUS
Status: DISCONTINUED | OUTPATIENT
Start: 2017-01-26 | End: 2017-01-27 | Stop reason: HOSPADM

## 2017-01-26 RX ORDER — SODIUM CHLORIDE, SODIUM LACTATE, POTASSIUM CHLORIDE, CALCIUM CHLORIDE 600; 310; 30; 20 MG/100ML; MG/100ML; MG/100ML; MG/100ML
125 INJECTION, SOLUTION INTRAVENOUS CONTINUOUS
Status: DISCONTINUED | OUTPATIENT
Start: 2017-01-26 | End: 2017-01-26

## 2017-01-26 RX ORDER — FAMOTIDINE 20 MG/50ML
20 INJECTION, SOLUTION INTRAVENOUS ONCE
Status: DISCONTINUED | OUTPATIENT
Start: 2017-01-26 | End: 2017-01-26 | Stop reason: RX

## 2017-01-26 RX ORDER — ACETAMINOPHEN 10 MG/ML
1000 INJECTION, SOLUTION INTRAVENOUS EVERY 6 HOURS
Status: DISCONTINUED | OUTPATIENT
Start: 2017-01-26 | End: 2017-01-27 | Stop reason: HOSPADM

## 2017-01-26 RX ORDER — CEFAZOLIN SODIUM 2 G/50ML
2 SOLUTION INTRAVENOUS EVERY 8 HOURS
Status: COMPLETED | OUTPATIENT
Start: 2017-01-26 | End: 2017-01-27

## 2017-01-26 RX ORDER — HYDROMORPHONE HYDROCHLORIDE 1 MG/ML
0.5 INJECTION, SOLUTION INTRAMUSCULAR; INTRAVENOUS; SUBCUTANEOUS
Status: DISCONTINUED | OUTPATIENT
Start: 2017-01-26 | End: 2017-01-27

## 2017-01-26 RX ORDER — ENOXAPARIN SODIUM 100 MG/ML
40 INJECTION SUBCUTANEOUS ONCE
Status: COMPLETED | OUTPATIENT
Start: 2017-01-26 | End: 2017-01-26

## 2017-01-26 RX ORDER — ENOXAPARIN SODIUM 100 MG/ML
40 INJECTION SUBCUTANEOUS EVERY 12 HOURS
Status: DISCONTINUED | OUTPATIENT
Start: 2017-01-26 | End: 2017-01-27 | Stop reason: HOSPADM

## 2017-01-26 RX ORDER — SODIUM CHLORIDE, SODIUM LACTATE, POTASSIUM CHLORIDE, CALCIUM CHLORIDE 600; 310; 30; 20 MG/100ML; MG/100ML; MG/100ML; MG/100ML
125 INJECTION, SOLUTION INTRAVENOUS CONTINUOUS
Status: DISCONTINUED | OUTPATIENT
Start: 2017-01-26 | End: 2017-01-26 | Stop reason: HOSPADM

## 2017-01-26 RX ORDER — MIDAZOLAM HYDROCHLORIDE 1 MG/ML
INJECTION, SOLUTION INTRAMUSCULAR; INTRAVENOUS AS NEEDED
Status: DISCONTINUED | OUTPATIENT
Start: 2017-01-26 | End: 2017-01-26 | Stop reason: HOSPADM

## 2017-01-26 RX ORDER — CEFAZOLIN SODIUM 2 G/50ML
2 SOLUTION INTRAVENOUS ONCE
Status: COMPLETED | OUTPATIENT
Start: 2017-01-26 | End: 2017-01-26

## 2017-01-26 RX ORDER — FENTANYL CITRATE 50 UG/ML
INJECTION, SOLUTION INTRAMUSCULAR; INTRAVENOUS AS NEEDED
Status: DISCONTINUED | OUTPATIENT
Start: 2017-01-26 | End: 2017-01-26 | Stop reason: HOSPADM

## 2017-01-26 RX ORDER — LIDOCAINE HYDROCHLORIDE 20 MG/ML
INJECTION, SOLUTION EPIDURAL; INFILTRATION; INTRACAUDAL; PERINEURAL AS NEEDED
Status: DISCONTINUED | OUTPATIENT
Start: 2017-01-26 | End: 2017-01-26 | Stop reason: HOSPADM

## 2017-01-26 RX ORDER — ROCURONIUM BROMIDE 10 MG/ML
INJECTION, SOLUTION INTRAVENOUS AS NEEDED
Status: DISCONTINUED | OUTPATIENT
Start: 2017-01-26 | End: 2017-01-26 | Stop reason: HOSPADM

## 2017-01-26 RX ORDER — METOCLOPRAMIDE HYDROCHLORIDE 5 MG/ML
INJECTION INTRAMUSCULAR; INTRAVENOUS AS NEEDED
Status: DISCONTINUED | OUTPATIENT
Start: 2017-01-26 | End: 2017-01-26 | Stop reason: HOSPADM

## 2017-01-26 RX ADMIN — FENTANYL CITRATE 50 MCG: 50 INJECTION, SOLUTION INTRAMUSCULAR; INTRAVENOUS at 10:21

## 2017-01-26 RX ADMIN — FENTANYL CITRATE 100 MCG: 50 INJECTION, SOLUTION INTRAMUSCULAR; INTRAVENOUS at 10:11

## 2017-01-26 RX ADMIN — SODIUM CHLORIDE, SODIUM LACTATE, POTASSIUM CHLORIDE, AND CALCIUM CHLORIDE 150 ML/HR: 600; 310; 30; 20 INJECTION, SOLUTION INTRAVENOUS at 18:20

## 2017-01-26 RX ADMIN — ONDANSETRON 4 MG: 2 INJECTION INTRAMUSCULAR; INTRAVENOUS at 12:11

## 2017-01-26 RX ADMIN — SODIUM CHLORIDE, SODIUM LACTATE, POTASSIUM CHLORIDE, AND CALCIUM CHLORIDE 1000 ML: 600; 310; 30; 20 INJECTION, SOLUTION INTRAVENOUS at 17:18

## 2017-01-26 RX ADMIN — KETOROLAC TROMETHAMINE 30 MG: 30 INJECTION, SOLUTION INTRAMUSCULAR at 18:19

## 2017-01-26 RX ADMIN — ROCURONIUM BROMIDE 10 MG: 10 INJECTION, SOLUTION INTRAVENOUS at 11:27

## 2017-01-26 RX ADMIN — Medication 3 MG: at 12:21

## 2017-01-26 RX ADMIN — GLYCOPYRROLATE 0.4 MG: 0.2 INJECTION INTRAMUSCULAR; INTRAVENOUS at 12:21

## 2017-01-26 RX ADMIN — KETOROLAC TROMETHAMINE 30 MG: 30 INJECTION, SOLUTION INTRAMUSCULAR at 14:04

## 2017-01-26 RX ADMIN — MIDAZOLAM HYDROCHLORIDE 2 MG: 1 INJECTION, SOLUTION INTRAMUSCULAR; INTRAVENOUS at 10:11

## 2017-01-26 RX ADMIN — CEFAZOLIN SODIUM 2 G: 2 SOLUTION INTRAVENOUS at 18:19

## 2017-01-26 RX ADMIN — FAMOTIDINE 20 MG: 10 INJECTION, SOLUTION INTRAVENOUS at 07:53

## 2017-01-26 RX ADMIN — HYDROMORPHONE HYDROCHLORIDE 1 MG: 1 INJECTION, SOLUTION INTRAMUSCULAR; INTRAVENOUS; SUBCUTANEOUS at 18:19

## 2017-01-26 RX ADMIN — ONDANSETRON 4 MG: 2 INJECTION INTRAMUSCULAR; INTRAVENOUS at 22:31

## 2017-01-26 RX ADMIN — FENTANYL CITRATE 50 MCG: 50 INJECTION, SOLUTION INTRAMUSCULAR; INTRAVENOUS at 12:24

## 2017-01-26 RX ADMIN — ROCURONIUM BROMIDE 50 MG: 10 INJECTION, SOLUTION INTRAVENOUS at 10:21

## 2017-01-26 RX ADMIN — ACETAMINOPHEN 1000 MG: 10 INJECTION, SOLUTION INTRAVENOUS at 10:38

## 2017-01-26 RX ADMIN — FENTANYL CITRATE 50 MCG: 50 INJECTION, SOLUTION INTRAMUSCULAR; INTRAVENOUS at 13:51

## 2017-01-26 RX ADMIN — ACETAMINOPHEN 1000 MG: 10 INJECTION, SOLUTION INTRAVENOUS at 18:19

## 2017-01-26 RX ADMIN — FENTANYL CITRATE 50 MCG: 50 INJECTION, SOLUTION INTRAMUSCULAR; INTRAVENOUS at 13:41

## 2017-01-26 RX ADMIN — FENTANYL CITRATE 50 MCG: 50 INJECTION, SOLUTION INTRAMUSCULAR; INTRAVENOUS at 12:55

## 2017-01-26 RX ADMIN — SODIUM CHLORIDE, SODIUM LACTATE, POTASSIUM CHLORIDE, AND CALCIUM CHLORIDE: 600; 310; 30; 20 INJECTION, SOLUTION INTRAVENOUS at 12:20

## 2017-01-26 RX ADMIN — HYDROMORPHONE HYDROCHLORIDE 1 MG: 1 INJECTION, SOLUTION INTRAMUSCULAR; INTRAVENOUS; SUBCUTANEOUS at 22:24

## 2017-01-26 RX ADMIN — CEFAZOLIN SODIUM 2 G: 2 SOLUTION INTRAVENOUS at 10:25

## 2017-01-26 RX ADMIN — PROPOFOL 200 MG: 10 INJECTION, EMULSION INTRAVENOUS at 10:21

## 2017-01-26 RX ADMIN — SODIUM CHLORIDE, SODIUM LACTATE, POTASSIUM CHLORIDE, AND CALCIUM CHLORIDE: 600; 310; 30; 20 INJECTION, SOLUTION INTRAVENOUS at 10:28

## 2017-01-26 RX ADMIN — ACETAMINOPHEN 1000 MG: 10 INJECTION, SOLUTION INTRAVENOUS at 22:25

## 2017-01-26 RX ADMIN — METOCLOPRAMIDE HYDROCHLORIDE 10 MG: 5 INJECTION INTRAMUSCULAR; INTRAVENOUS at 12:11

## 2017-01-26 RX ADMIN — FENTANYL CITRATE 50 MCG: 50 INJECTION, SOLUTION INTRAMUSCULAR; INTRAVENOUS at 12:26

## 2017-01-26 RX ADMIN — ROCURONIUM BROMIDE 10 MG: 10 INJECTION, SOLUTION INTRAVENOUS at 11:52

## 2017-01-26 RX ADMIN — ENOXAPARIN SODIUM 40 MG: 40 INJECTION SUBCUTANEOUS at 07:51

## 2017-01-26 RX ADMIN — HYDROMORPHONE HYDROCHLORIDE 1 MG: 2 INJECTION, SOLUTION INTRAMUSCULAR; INTRAVENOUS; SUBCUTANEOUS at 11:56

## 2017-01-26 RX ADMIN — SODIUM CHLORIDE, SODIUM LACTATE, POTASSIUM CHLORIDE, AND CALCIUM CHLORIDE: 600; 310; 30; 20 INJECTION, SOLUTION INTRAVENOUS at 11:26

## 2017-01-26 RX ADMIN — SODIUM CHLORIDE, SODIUM LACTATE, POTASSIUM CHLORIDE, AND CALCIUM CHLORIDE 125 ML/HR: 600; 310; 30; 20 INJECTION, SOLUTION INTRAVENOUS at 07:51

## 2017-01-26 RX ADMIN — LIDOCAINE HYDROCHLORIDE 100 MG: 20 INJECTION, SOLUTION EPIDURAL; INFILTRATION; INTRACAUDAL; PERINEURAL at 10:21

## 2017-01-26 RX ADMIN — FENTANYL CITRATE 50 MCG: 50 INJECTION, SOLUTION INTRAMUSCULAR; INTRAVENOUS at 12:39

## 2017-01-26 NOTE — PERIOP NOTES
TRANSFER - IN REPORT:    Verbal report received from  [facility] (name) on Vernell Steinberg  being received from OR (unit) for routine progression of care      Report consisted of patients Situation, Background, Assessment and   Recommendations(SBAR). Information from the following report(s) OR Summary, Procedure Summary and Intake/Output was reviewed with the receiving nurse. Opportunity for questions and clarification was provided. Assessment completed upon patients arrival to unit and care assumed.

## 2017-01-26 NOTE — INTERVAL H&P NOTE
H&P Update:  Flori Kennedy was seen and examined. History and physical has been reviewed. The patient has been examined.  There have been no significant clinical changes since the completion of the originally dated History and Physical.    Signed By: Mikaela Carrillo MD     January 26, 2017 9:50 AM

## 2017-01-26 NOTE — OP NOTES
OPERATIVE REPORT                           Antwan Romero                 : 1985                Medical Record [id number]                  Pre-operative Diagnosis:  MORBID OBESITY,BMI 54, FATTY LIVER                Post-operative Diagnosis: MORBID OBESITY,BMI 54, FATTY LIVER                Procedure: Procedure(s):  LAPAROSCOPIC GASTRIC BYPASS (antecolic / antegastric)  WEDGE LIVER BIOPSY  INTRAOPERATIVE ENDOSCOPY WITH BIOPSY                Location: [facility]                Surgeon: Josh Redding MD                Assistant:  Isabella Whelan [facility]                  Anesthesia: General                 Specimen:  Liver Wedge  Biopsy                EBL: less than 20 cc                Additional Findings: none                      STATEMENT OF MEDICAL NECESSITY: The patient is a 36y.o.-year-old female who has had a long-standing history of obesity. She has developed health problems related to  obesity and has significant cardiac disease and came to me in consultation  for the gastric bypass procedure. she has undergone preoperative evaluation and was felt to be a reasonable candidate for surgery. I explained to the patient the risks associated with this procedure and she understood all this very clearly and did wish to proceed with operative intervention at this time period. OPERATIVE PROCEDURE: The patient was brought to the operating room, placed on the table in the supine position at which time period general anesthesia was administered without any difficulty. The abdomen was then prepped and draped in  The usual sterile fashion. Using a 15 blade, a 1 cm incision was made just to the left of the midline at the level of the umbilicus. A Veres needle approach was used to gain access to the peritoneal cavity which was then insufflated.  The Visiport was then placed at that site insufflating the abdomen, then 4 additional trocars were placed in the usual U-shaped configuration with a subxiphoid incision being made to accommodate the [facility] retractor. On entering the abdomen, the patient was noted to have a moderate fatty liver with some evidence of nodularity throughout possibly consistent with early steatohepatitis. I began the operation by choosing an area approximately 50 cm distal to the ligament of Treitz. I transected the small bowel using the Endo SAI stapler with white reloads, I then divided the mesentery of the small bowel using 3 firings of the Endo SAI stapler, which allowed for adequate mobilization to the upper abdominal region. I then measured off a 150-cm Tiffanie limb bypass and placed  it in a side-to-side fashion with the afferent limb placing stay sutures in the 2 limbs of the bowel. I then created enterotomies in the 2 limbs of the bowel and placed the Endo SAI stapler intra luminally closing it and firing  it creating the linear anastomosis. With this anastomosis being hemostatic, the free margin of the enterotomy was then re approximated using 2-0 Ethibond suture and these sutures were then held up to facilitate closure using the stapling device. The mesenteric defect at this site was then closed  using a running 2-0 Ethibond suture. I then elected to bring the tiffanie limb anterior to the transverse colon and did so in the usual fashion. The tiffanie limb reached nicely to the upper abdominal region under no tension. I then placed a Baker's tube transorally in the stomach and inflated the balloon to 20 mL of saline solution and then I pulled it back towards the gastroesophageal junction. I then at this juncture dissected along the angle of His, exposing the left crura and I likewise dissected along the lesser curvature of the stomach, entering the retro gastric space. Once this space was then developed, I then marked the planned anastomosis of the pouch using a single dot of dilute methylene blue. I then removed the Baker's tube at this juncture.  An anterior gastrotomy was then fashioned in the antrum of the stomach, then the cap of a 21 ILS stapler was then placed transabdominally guiding it through the gastrotomy out through the anterior gastric pouch in the area marked using a flamingo grasper and a Prolene suture attached to the cap. After retrieving the cap, a purse string suture was then placed at the base and tied securely. I then created the pouch using the powered [place] stapler with blue reloads. I used one firing along the base of the planned pouch then 3 vertical firings completing this linear 20 mL pouch. With the pouch having been created, the anterior gastrotomy was then closed using the same stapling device. I then at this juncture brought the Tiffanie limb in a antecolic, antegastric fashion. It reached nicely to the level of the pouch under no tension at all. I then opened up the free end of the Tiffanie limb using the Harmonic scalpel. I guided the circular stapling device transabdominally through the left lower quadrant incision, guiding it through the enterotomy thendeploying the spear through the antimesenteric border of the bowel. It was then attached to the cap, closed and fired creating the circular anastomosis. With 2 very good tissue rings  noted within the stapling device, the free margin of the Tiffanie limb was then closed using the Endo SAI stapler with white reloads. I then over sewed the anastomosis using 2-0 Ethibond suture, then tested the pouch using dilute methylene blue noting it to be completely water tight. I then left the operative field and proceeded to the the head of the bed and performed an intraoperative EGD. The scope was passed successfully into the gastric pouch to the level of the anastomosis. There was no bleeding noted and no leak appreciated with air insufflation. A biopsy for H. Pylori was performed and submitted to pathology. I then returned to the surgical field.   At this juncture I then straightened out the Tiffanie limb which  passed anterior to the transverse colon. When this was all achieved, I then turned my attention to checking all areas for hemostasis using Eviseal and Sugicel SNOW to achieve this. I then removed the liver retractor and due to its abnormal appearance  I did perform a liver wedge biopsy it to assess for fibrosis and submitted it to pathology for permanent section. I then placed a J-P drain in the upper abdominal region. I removed all trocars and closed the left lower quadrant trocar site using a transabdominal 0 PDS suture along the fascia. All skin incisions were then closed using 4-0 sutures of Monocryl and Steri-Strips and sterile dressings were applied. The patient tolerated the procedure well.                  Burton Victoria M.D.

## 2017-01-26 NOTE — H&P (VIEW-ONLY)
Gastric Bypass - History and Physical    Subjective: The patient is a 36 y.o. obese female with a Body mass index is 55.08 kg/(m^2). .   she presents now to review their work up to date   to see if they are a candidate for surgery and whether or not to proceed with the previously requested procedure. Bariatric comorbidities continue to include:   Patient Active Problem List   Diagnosis Code    Morbid obesity with BMI of 50.0-59.9, adult ([facility] E66.01, Z68.43    Morbid obesity ([facility] E66.01    Hypothyroidism E03.9    Anxiety F41.9    EVI (stress urinary incontinence, female) N39.3    Snores R06.83    Irregular menses N92.6    Arthritic-like pain M25.50    Hypertension I10       They have been generally well prior to this visit and have had no recent significant illnesses. The patient has had no gastrointestinal issues   that would preclude them from proceeding with the surgery they have chosen. Evelin Fernandez has recently tried a preoperative weight loss   program  in addition to seeing a bariatric nutritionist preoperatively. We have discussed on at least one other occasion about the various types   of surgical weight loss procedures and they have considered these options after our initial consultation. We have once again discussed these   procedures in detail and they have now decided on a surgical procedure. They present today to discuss this and confirm that their evaluation   pre operatively is acceptable to continue with surgery. The patient desires laparoscopic gastric bypass surgery for surgical weight loss. The patients goal weight is 182 lb. (this represents a BMI of 27)    These goals are not typically consistent with expected outcomes of their desired operation and she understands that a multiyear dedication to   diet and exercise are required to achieve this goal.  her Medical goals are resolution of these health issues.     Patient Active Problem List    Diagnosis Date Noted    Hypertension     Morbid obesity with BMI of 50.0-59.9, adult ([facility]     Morbid obesity ([facility]     Hypothyroidism     Anxiety     EVI (stress urinary incontinence, female)     Snores     Irregular menses     Arthritic-like pain       Past Surgical History   Procedure Laterality Date    Dilation and curettage       X 2 both for miscarriage    Hx lap cholecystectomy        Social History   Substance Use Topics    Smoking status: Never Smoker    Smokeless tobacco: Never Used    Alcohol use Yes      Comment: monthly      Family History   Problem Relation Age of Onset    Kidney Disease Father     Cancer Father       Current Outpatient Prescriptions   Medication Sig Dispense Refill    oxyCODONE-acetaminophen (PERCOCET) 5-325 mg per tablet Take 1 Tab by mouth every four (4) hours as needed for Pain. Max Daily Amount: 6 Tabs. 30 Tab 0    citalopram (CELEXA) 40 mg tablet Take 40 mg by mouth every evening.  LEVOTHYROXINE SODIUM (SYNTHROID PO) Take  by mouth.        No Known Allergies       Review of Systems:        General - No history or complaints of unexpected fever, chills, or weight loss  Head/Neck - No history or complaints of headache, diplopia, dysphagia, hearing loss  Cardiac - No history or complaints of chest pain, palpitations, murmur, or shortness of breath  Pulmonary - No history or complaints of shortness of breath, productive cough, hemoptysis  Gastrointestinal - no reflux,no abdominal pain, obstipation/constipation or blood per rectum  Genitourinary - No history or complaints of hematuria/dysuria, or renal lithiasis  Musculoskeletal - moderate joint pain in their knee, no muscular weakness  Hematologic - No history or complaints of bleeding disorders, No blood transfusions  Neurologic - No history or complaints of migraine headaches, seizure activity, syncopal episodes, TIA or stroke  Integumentary - No history or complaints of rashes, abnormal nevi, skin cancer  Gynecological - irregular menses    Objective:     Visit Vitals    /74 (BP 1 Location: Left arm, BP Patient Position: Sitting)    Pulse 100    Resp 16    Ht 5' 9\" (1.753 m)    Wt (!) 169.2 kg (373 lb)    LMP 01/02/2017 (Approximate)    SpO2 98%    BMI 55.08 kg/m2        Physical Examination: General appearance - alert, well appearing, and in no distress and oriented to person, place, and time  Mental status - alert, oriented to person, place, and time, normal mood, behavior, speech, dress, motor activity, and thought processes  Eyes - pupils equal and reactive, extraocular eye movements intact, sclera anicteric, left eye normal, right eye normal  Ears - bilateral TM's and external ear canals normal, right ear normal, left ear normal  Nose - normal and patent, no erythema, discharge or polyps and normal nontender sinuses  Mouth - mucous membranes moist, pharynx normal without lesions  Neck - supple, no significant adenopathy  Lymphatics - no palpable lymphadenopathy, no hepatosplenomegaly  Chest - clear to auscultation, no wheezes, rales or rhonchi, symmetric air entry  Heart - normal rate, regular rhythm, normal S1, S2, no murmurs, rubs, clicks or gallops  Abdomen - soft, nontender, nondistended, no masses or organomegaly  Back exam - full range of motion, no tenderness, palpable spasm or pain on motion  Neurological - alert, oriented, normal speech, no focal findings or movement disorder noted  Musculoskeletal - no joint tenderness, deformity or swelling  Extremities - peripheral pulses normal, no pedal edema, no clubbing or cyanosis  Skin - normal coloration and turgor, no rashes, no suspicious skin lesions noted    Labs / Preoperative Evaluations:     Recent Results (from the past 1008 hour(s))   EKG, 12 LEAD, INITIAL    Collection Time: 01/23/17 12:05 PM   Result Value Ref Range    Ventricular Rate 66 BPM    Atrial Rate 66 BPM    P-R Interval 168 ms    QRS Duration 92 ms    Q-T Interval 426 ms    QTC Calculation (Odin) 443 ms    Calculated P Axis 63 degrees    Calculated R Axis 74 degrees    Calculated T Axis 57 degrees    Diagnosis       Normal sinus rhythm  Normal ECG  No previous ECGs available     CBC WITH AUTOMATED DIFF    Collection Time: 01/23/17 12:17 PM   Result Value Ref Range    WBC 10.2 4.6 - 13.2 K/uL    RBC 4.75 4.20 - 5.30 M/uL    HGB 13.3 12.0 - 16.0 g/dL    HCT 40.7 35.0 - 45.0 %    MCV 85.7 74.0 - 97.0 FL    MCH 28.0 24.0 - 34.0 PG    MCHC 32.7 31.0 - 37.0 g/dL    RDW 13.4 11.6 - 14.5 %    PLATELET 848 030 - 606 K/uL    MPV 9.9 9.2 - 11.8 FL    NEUTROPHILS 55 40 - 73 %    LYMPHOCYTES 34 21 - 52 %    MONOCYTES 9 3 - 10 %    EOSINOPHILS 2 0 - 5 %    BASOPHILS 0 0 - 2 %    ABS. NEUTROPHILS 5.6 1.8 - 8.0 K/UL    ABS. LYMPHOCYTES 3.4 0.9 - 3.6 K/UL    ABS. MONOCYTES 0.9 0.05 - 1.2 K/UL    ABS. EOSINOPHILS 0.2 0.0 - 0.4 K/UL    ABS. BASOPHILS 0.0 0.0 - 0.06 K/UL    DF AUTOMATED     METABOLIC PANEL, COMPREHENSIVE    Collection Time: 01/23/17 12:17 PM   Result Value Ref Range    Sodium 140 136 - 145 mmol/L    Potassium 3.9 3.5 - 5.5 mmol/L    Chloride 103 100 - 108 mmol/L    CO2 28 21 - 32 mmol/L    Anion gap 9 3.0 - 18 mmol/L    Glucose 84 74 - 99 mg/dL    BUN 11 7.0 - 18 MG/DL    Creatinine 0.75 0.6 - 1.3 MG/DL    BUN/Creatinine ratio 15 12 - 20      GFR est AA >60 >60 ml/min/1.73m2    GFR est non-AA >60 >60 ml/min/1.73m2    Calcium 8.8 8.5 - 10.1 MG/DL    Bilirubin, total 0.3 0.2 - 1.0 MG/DL    ALT 24 13 - 56 U/L    AST 16 15 - 37 U/L    Alk.  phosphatase 45 45 - 117 U/L    Protein, total 7.6 6.4 - 8.2 g/dL    Albumin 3.7 3.4 - 5.0 g/dL    Globulin 3.9 2.0 - 4.0 g/dL    A-G Ratio 0.9 0.8 - 1.7     TYPE & SCREEN    Collection Time: 01/23/17 12:18 PM   Result Value Ref Range    Crossmatch Expiration 01/29/2017     ABO/Rh(D) PENDING     Antibody screen PENDING        Assessment:     Morbid obesity with associated comorbidity    Plan:     laparoscopic gastric bypass surgery    This is a 36 y.o. female with a BMI of Body mass index is 55.08 kg/(m^2). and the weight-related co-morbidties as noted above. [place] meets the NIH criteria for bariatric surgery based upon the BMI of Body mass index is 55.08 kg/(m^2). and multiple weight-related co-morbidties. Jason Layne has elected laparoscopic gastric bypass as her intervention of choice for treatment of morbid obestiy through surgical means secondary to its uniform results,  profound baseline suppression of hunger and pace at which weight is lost.    In the office today, following Eula's history and physical examination, a 40 minute discussion regarding the anatomic alterations for the laparoscopic gastric bypass  was undertaken. The dietary expectations and the patient  dependent factors for success were thoroughly discussed, to include the need for interval follow-up and long-term dietary changes associated with success. The possible short and long term  complications of the gastric bypass were also discussed, to include but not limited to;death, DVT/PE, staple line leak, bleeding, stricture formation, infection,internal hernia  and pouch dilation. Specific weight related outcomes for success were also discussed with an emphasis on careful and close follow-up with the first year and Dietary behavior modification over the first years as baseline cyclical hunger returns  The patient expressed an understanding of the above factors, and her questions were answered in their entirety. In addition, the patient attended a 1.5 hour power point seminar regarding obesity, surgical weight loss including, adjustable gastric band, gastric bypass, and sleeve gastrectomy. This discussion contrasted the different surgical techniques, mechanisms of actions and expected outcomes, and surgical and medical risks associated with each procedure. During this seminar, there was a long question and answer session where each questions was answered until there were no additional questions.      Today, the patient had all of her questions answered and the decision was made today that the patient's preoperative evaluation is acceptable for them  to proceed with bariatric surgery  choosing adjustable gastric bypass as her surgical option. The patient understands the plan of action      Secondary Diagnoses:     DVT / Pulmonary Embolus Risk - The patient is at a higher risk for post operative DVT / pulmonary embolus secondary to their morbid obese status, relative sedentary lifestyle, and impending general anesthetic. We will plan to use anticoagulation therapy pre and post operative as well as  pneumatic compression devices and encourage ambulation once on the hospital nursing floor. The need for possible at home anticoagulation therapy has also been discussed and any decision on this matter will be made during post operative evaluations. The patient understands that their efforts at ambulation are of vital importance to reduce the risk of this complication thus placing significant burden on them as to the prevention of such issues. Signs and symptoms of DVT / PE have been discussed with the patient and they have been instructed to call the office if any these occur in the \"at home\" post op phase. Weight Related Arthritis -The patient understands the benefits that weight loss surgery can have on their arthritis but also understands that weight loss is not a guaranteed cure and relief of symptoms is often dependent on the severity of the underlying disease.  The patient also understands that traditional pharmaceutical treatments for this diagnosis are usually unavailable to post-operative weight loss patients due to the effects on the gastrointestinal tract particularly with the gastric bypass and to a lesser effect with the sleeve gastrectomy.  Any changes to the patients medication treatment will ultimately be made the patients PCP with input by our office.           Signed By: Mikaela Carrillo MD     January 23, 2017

## 2017-01-26 NOTE — ANESTHESIA PREPROCEDURE EVALUATION
Anesthetic History   No history of anesthetic complications     Pertinent negatives: No PONV       Review of Systems / Medical History  Patient summary reviewed, nursing notes reviewed and pertinent labs reviewed    Pulmonary              Pertinent negatives: No asthma     Neuro/Psych   Within defined limits           Cardiovascular    Hypertension            Pertinent negatives: No dysrhythmias and angina  Exercise tolerance: >4 METS     GI/Hepatic/Renal             Pertinent negatives: No GERD   Endo/Other      Hypothyroidism  Morbid obesity     Other Findings              Physical Exam    Airway  Mallampati: III  TM Distance: 4 - 6 cm  Neck ROM: normal range of motion, short neck   Mouth opening: Normal     Cardiovascular    Rhythm: regular  Rate: normal         Dental  No notable dental hx       Pulmonary  Breath sounds clear to auscultation               Abdominal  GI exam deferred       Other Findings            Anesthetic Plan    ASA: 3  Anesthesia type: general          Induction: Intravenous  Anesthetic plan and risks discussed with: Patient      Plan GETA. Pt understands risks and agrees to proceed.

## 2017-01-26 NOTE — PERIOP NOTES
Patient transfer to room 311. Family at bedside. Handoff with Fiona Farmer. Blood pressure 131/68, pulse 90, temperature 98.4 °F (36.9 °C), resp. rate 14, height 5' 9\" (1.753 m), weight (!) 167.6 kg (369 lb 6.4 oz), last menstrual period 01/02/2017, SpO2 98 %, unknown if currently breastfeeding.

## 2017-01-26 NOTE — ANESTHESIA POSTPROCEDURE EVALUATION
Post-Anesthesia Evaluation and Assessment    Cardiovascular Function/Vital Signs  Visit Vitals    /83    Pulse 67    Temp 37.9 °C (100.2 °F)    Resp 12    Ht 5' 9\" (1.753 m)    Wt (!) 167.6 kg (369 lb 6.4 oz)    SpO2 98%    BMI 54.55 kg/m2       Patient is status post Procedure(s):  LAPAROSCOPIC GASTRIC BYPASS,WEDGE LIVER BIOPSY, INTRAOPERATIVE ENDOSCOPY WITH BIOPSY. Nausea/Vomiting: Controlled. Postoperative hydration reviewed and adequate. Pain:  Pain Scale 1: FLACC (01/26/17 1305)  Pain Intensity 1: 0 (01/26/17 1305)   Managed. Neurological Status:   Neuro (WDL): Exceptions to WDL (01/26/17 1256)   At baseline. Mental Status and Level of Consciousness: Baseline and appropriate for discharge. Pulmonary Status:   O2 Device: Non-rebreather mask (01/26/17 1234)   Adequate oxygenation and airway patent. Complications related to anesthesia: None    Post-anesthesia assessment completed. No concerns. Patient has met all discharge requirements.     Signed By: Dale Denny MD    January 26, 2017

## 2017-01-26 NOTE — IP AVS SNAPSHOT
Summary of Care Report The Summary of Care report has been created to help improve care coordination. Users with access to [employer] or [address] (Web-based application) may access additional patient information including the Discharge Summary. If you are not currently a [address] user and need more information, please call the number listed below in the [place] [address] section and ask to be connected with Medical Records. Facility Information Name Address Phone [address] [address] [phone] Patient Information Patient Name Sex  Everett Dasilva ([id number]) Female 1985 Discharge Information Admitting Provider Service Area Unit Bandar Blank MD /  [address] Surg/Onco / [phone] Discharge Provider Discharge Date/Time Discharge Disposition Destination (none) 2017 13:10 (Pending) AHR (none) Patient Language Language ENGLISH [13] Problem List as of 2017  Date Reviewed: 2017 Codes Priority Class Noted - Resolved Morbid obesity with BMI of 50.0-59.9, adult (HCC) ICD-10-CM: E66.01, Z68.43 
ICD-9-CM: 278.01, V85.43   Unknown - Present Morbid obesity ([facility] [address] ICD-10-CM: E66.01 
ICD-9-CM: 278.01   Unknown - Present Hypothyroidism ICD-10-CM: E03.9 ICD-9-CM: 244.9   Unknown - Present Anxiety ICD-10-CM: F41.9 ICD-9-CM: 300.00   Unknown - Present EVI (stress urinary incontinence, female) ICD-10-CM: N39.3 ICD-9-CM: 625.6   Unknown - Present Snores ICD-10-CM: R06.83 
ICD-9-CM: 786.09   Unknown - Present Irregular menses ICD-10-CM: N92.6 ICD-9-CM: 626.4   Unknown - Present Arthritic-like pain ICD-10-CM: M25.50 ICD-9-CM: 719.40   Unknown - Present Overview Signed 2017  2:46 PM by ALEXIS Champion  
  knees Hypertension ICD-10-CM: I10 
ICD-9-CM: 401.9   Unknown - Present Overview Signed 1/23/2017  3:04 PM by ALEXIS Anguiano  
  during preg Morbid obesity with body mass index of 50.0-59.9 in adult [facility]) ICD-10-CM: E66.01, Z68.43 
ICD-9-CM: 278.01, V85.43   1/26/2017 - Present You are allergic to the following No active allergies Current Discharge Medication List  
  
CONTINUE these medications which have NOT CHANGED Dose & Instructions Dispensing Information Comments CeleXA 40 mg tablet Generic drug:  citalopram  
 Dose:  40 mg Take 40 mg by mouth every evening. Refills:  0  
   
 oxyCODONE-acetaminophen 5-325 mg per tablet Commonly known as:  PERCOCET Dose:  1 Tab Take 1 Tab by mouth every four (4) hours as needed for Pain. Max Daily Amount: 6 Tabs. Quantity:  30 Tab Refills:  0  
   
 SYNTHROID PO Take  by mouth. Refills:  0 Surgery Information ID Date/Time Status Primary Surgeon All Procedures Location [id number] 1/26/2017 [address] MD Alireza LAPAROSCOPIC GASTRIC BYPASS,WEDGE LIVER BIOPSY, INTRAOPERATIVE ENDOSCOPY WITH BIOPSY [facility] MAIN OR Follow-up Information Follow up With Details Comments Contact Info [facility] Mann Valencia On 2/10/2017 Follow-up appointment @ 10:45 a.m. [address] [facility] [address] [place] [id number] 
[phone] Allison Raymundo MD   Patient can only remember the practice name and not the physician Discharge Instructions [facility] Carl Cameron M.D., F.A.C.S. 
[address] Office: [phone]    Fax:  [phone] Discharge Instruction for Gastric Bypass / Sleeve Gastrectomy Patients Diet: 
? Continue with the liquid diet until you are seen in the office. Make sure you sip fluids all day.  Goal for total fluid intake is at least 64 ounces per day. Aim for  Gms of protein every day. Activity: 
? Walking is encouraged. Rest when you are tired. ? You may shower. ? You may climb stairs. Take your time. ? No lifting more than 10-15 lbs. ? If you feel discomfort during an activity, rest. 
? Do not drive for 1 week or until off of all narcotics. Wound Care: ? Clean incisions with soap and water when in the shower. Pat dry. ? Leave steri-strips on until they fall off. ? A small amount of drainage may be present from the incisions. Contact the office if you notice an increase in drainage, an odor, increased redness, or fever > 100.5. Medications: 
? You will receive a prescription for pain medication and Prilosec at the time of discharge. ? For upset stomach you may take over the counter medications such as Maalox, Mylanta, Pepcid, Zantac, or Tagamet. ? Gas X works very well for bloating when eating or drinking ? You may take Tylenol 
? Non-aspirin based arthritis medications may be resumed ? Take a childrens or adult chewable multivitamin. ? Milk of Magnesia will help with constipation. ? It is fine to take your usual home medications. Blood pressure medications should be continued after surgery. Diabetic medications can frequently be reduced very quickly after surgery. Diabetics should continue to monitor blood sugars frequently after surgery and contact the prescribing physician for any questions. Follow-Up Appointment: 
? If you do not already have a follow-up appointment scheduled, contact the office in the next few days to obtain one. It is usually scheduled for 10-14 days after you surgery date. Office phone number:  [phone]. REV  01/2017 Patient armband removed and shredded Chart Review Routing History No Routing History on File

## 2017-01-26 NOTE — IP AVS SNAPSHOT
Dimitrios Tolliver 
 
 
 [address] [facility] [address] 
[phone] Patient: Gayatri Landers MRN: [id number] :1985 You are allergic to the following No active allergies Recent Documentation Height Weight BMI OB Status Smoking Status 1.753 m (!) 173.9 kg 56.62 kg/m2 Having regular periods Never Smoker Emergency Contacts Name Discharge Info Relation Home Work Mobile Custis,Abimael DISCHARGE CAREGIVER [3] Life Partner [7]   [phone] About your hospitalization You were admitted on:  2017 You last received care in the:  [address] [place] [address] You were discharged on:  2017 Unit phone number:  [phone] Why you were hospitalized Your primary diagnosis was:  Not on File Your diagnoses also included: Morbid Obesity With Body Mass Index Of 50.0-59.9 In Adult (Hcc) Providers Seen During Your Hospitalizations Provider Role Specialty Primary office phone Lilly Urbina MD Attending Provider General Surgery [phone] Your Primary Care Physician (PCP) Primary Care Physician Office Phone Office Fax OTHER, PHYS ** None ** ** None ** Follow-up Information Follow up With Details Comments Contact Info [facility] Guillermina Arrieta On 2/10/2017 Follow-up appointment @ 10:45 a.m. [address] [facility] [address] [place] [address] 
[phone] Phys Zackary, MD   Patient can only remember the practice name and not the physician Your Appointments Friday February 10, 2017 10:45 AM EST  
POST OP with MD [facility] Guillermina Arrieta ([address])  
 [place] [address] [address] [facility]  
[phone]   1:30 PM EST Office Visit with Oneyda Watson NP  
 [facility] Erik ([facility])  
 [facility] [address]  
[phone] Thursday February 23, 2017  2:00 PM EST Office Visit with VANESSA NUTRI VISIT PEN [facility] Erik ([facility])  
 One [facility]  
[phone] Current Discharge Medication List  
  
CONTINUE these medications which have NOT CHANGED Dose & Instructions Dispensing Information Comments Morning Noon Evening Bedtime CeleXA 40 mg tablet Generic drug:  citalopram  
   
Your next dose is: Today, Tomorrow Other:  _________ Dose:  40 mg Take 40 mg by mouth every evening. Refills:  0  
     
   
   
   
  
 oxyCODONE-acetaminophen 5-325 mg per tablet Commonly known as:  PERCOCET Your next dose is: Today, Tomorrow Other:  _________ Dose:  1 Tab Take 1 Tab by mouth every four (4) hours as needed for Pain. Max Daily Amount: 6 Tabs. Quantity:  30 Tab Refills:  0  
     
   
   
   
  
 SYNTHROID PO Your next dose is: Today, Tomorrow Other:  _________ Take  by mouth. Refills:  0 Discharge Instructions [facility] Stephen Jernigan M.D., F.A.C.S. 
One [facility]. [address] [place] [address] Office: [phone]    Fax:  [phone] Discharge Instruction for Gastric Bypass / Sleeve Gastrectomy Patients Diet: 
? Continue with the liquid diet until you are seen in the office. Make sure you sip fluids all day. Goal for total fluid intake is at least 64 ounces per day. Aim for  Gms of protein every day. Activity: 
? Walking is encouraged. Rest when you are tired. ? You may shower. ? You may climb stairs. Take your time. ? No lifting more than 10-15 lbs. ? If you feel discomfort during an activity, rest. 
? Do not drive for 1 week or until off of all narcotics. Wound Care: ? Clean incisions with soap and water when in the shower. Pat dry. ? Leave steri-strips on until they fall off. ? A small amount of drainage may be present from the incisions. Contact the office if you notice an increase in drainage, an odor, increased redness, or fever > 100.5. Medications: 
? You will receive a prescription for pain medication and Prilosec at the time of discharge. ? For upset stomach you may take over the counter medications such as Maalox, Mylanta, Pepcid, Zantac, or Tagamet. ? Gas X works very well for bloating when eating or drinking ? You may take Tylenol 
? Non-aspirin based arthritis medications may be resumed ? Take a childrens or adult chewable multivitamin. ? Milk of Magnesia will help with constipation. ? It is fine to take your usual home medications. Blood pressure medications should be continued after surgery. Diabetic medications can frequently be reduced very quickly after surgery. Diabetics should continue to monitor blood sugars frequently after surgery and contact the prescribing physician for any questions. Follow-Up Appointment: 
? If you do not already have a follow-up appointment scheduled, contact the office in the next few days to obtain one. It is usually scheduled for 10-14 days after you surgery date. Office phone number:  [phone]. REV  01/2017 Patient armband removed and shredded Discharge Orders None [facility] Announcement We are excited to announce that we are making your provider's discharge notes available to you in [employer]. You will see these notes when they are completed and signed by the physician that discharged you from your recent hospital stay.   If you have any questions or concerns about any information you see in [employer], please call the [employer] Department where you were seen or reach out to your Primary Care Provider for more information about your plan of care. Introducing hospitals & HEALTH SERVICES! Candelaria Hernandez introduces [employer] patient portal. Now you can access parts of your medical record, email your doctor's office, and request medication refills online. 1. In your internet browser, go to [email or web address] 2. Click on the First Time User? Click Here link in the Sign In box. You will see the New Member Sign Up page. 3. Enter your [employer] Access Code exactly as it appears below. You will not need to use this code after youve completed the sign-up process. If you do not sign up before the expiration date, you must request a new code. · [employer] Access Code: [email or web address] Expires: 4/17/2017 10:09 AM 
 
4. Enter the last four digits of your Social Security Number (xxxx) and Date of Birth (mm/dd/yyyy) as indicated and click Submit. You will be taken to the next sign-up page. 5. Create a [employer] ID. This will be your [employer] login ID and cannot be changed, so think of one that is secure and easy to remember. 6. Create a [employer] password. You can change your password at any time. 7. Enter your Password Reset Question and Answer. This can be used at a later time if you forget your password. 8. Enter your e-mail address. You will receive e-mail notification when new information is available in [address] 9. Click Sign Up. You can now view and download portions of your medical record. 10. Click the Download Summary menu link to download a portable copy of your medical information. If you have questions, please visit the Frequently Asked Questions section of the [employer] website. Remember, [employer] is NOT to be used for urgent needs. For medical emergencies, dial 911. Now available from your iPhone and Android! General Information Please provide this summary of care documentation to your next provider. Patient Signature:  ____________________________________________________________ Date:  ____________________________________________________________  
  
Kevin Bougie Provider Signature:  ____________________________________________________________ Date:  ____________________________________________________________

## 2017-01-26 NOTE — NURSE NAVIGATOR
Doing well. Denies nausea. Pain = 4. Tolerating ice chips. Dressings dry and intact. Urinary output currently at 160ml. Dr. Caryn Hamilton notified. LUCRECIA output scant. Encouraged to cough, deep breathe, and use spirometer every hour while awake. Encouraged to be out of bed ambulating this evening. Discussed diet and activity progression tomorrow after UGI.

## 2017-01-27 ENCOUNTER — APPOINTMENT (OUTPATIENT)
Dept: GENERAL RADIOLOGY | Age: 32
DRG: 403 | End: 2017-01-27
Attending: SPECIALIST
Payer: MEDICAID

## 2017-01-27 VITALS
WEIGHT: 293 LBS | RESPIRATION RATE: 18 BRPM | DIASTOLIC BLOOD PRESSURE: 83 MMHG | HEART RATE: 91 BPM | HEIGHT: 69 IN | BODY MASS INDEX: 43.4 KG/M2 | SYSTOLIC BLOOD PRESSURE: 136 MMHG | OXYGEN SATURATION: 93 % | TEMPERATURE: 98.6 F

## 2017-01-27 PROCEDURE — 74011250637 HC RX REV CODE- 250/637: Performed by: SPECIALIST

## 2017-01-27 PROCEDURE — 74240 X-RAY XM UPR GI TRC 1CNTRST: CPT

## 2017-01-27 PROCEDURE — 74011250636 HC RX REV CODE- 250/636: Performed by: SPECIALIST

## 2017-01-27 PROCEDURE — 74011636320 HC RX REV CODE- 636/320: Performed by: SPECIALIST

## 2017-01-27 RX ORDER — OXYCODONE AND ACETAMINOPHEN 5; 325 MG/1; MG/1
1 TABLET ORAL
Status: DISCONTINUED | OUTPATIENT
Start: 2017-01-27 | End: 2017-01-27 | Stop reason: HOSPADM

## 2017-01-27 RX ADMIN — ACETAMINOPHEN 1000 MG: 10 INJECTION, SOLUTION INTRAVENOUS at 06:44

## 2017-01-27 RX ADMIN — SODIUM CHLORIDE, SODIUM LACTATE, POTASSIUM CHLORIDE, AND CALCIUM CHLORIDE 150 ML/HR: 600; 310; 30; 20 INJECTION, SOLUTION INTRAVENOUS at 02:16

## 2017-01-27 RX ADMIN — ENOXAPARIN SODIUM 40 MG: 40 INJECTION SUBCUTANEOUS at 01:19

## 2017-01-27 RX ADMIN — HYDROMORPHONE HYDROCHLORIDE 0.5 MG: 1 INJECTION, SOLUTION INTRAMUSCULAR; INTRAVENOUS; SUBCUTANEOUS at 08:22

## 2017-01-27 RX ADMIN — OXYCODONE HYDROCHLORIDE AND ACETAMINOPHEN 1 TABLET: 5; 325 TABLET ORAL at 12:27

## 2017-01-27 RX ADMIN — KETOROLAC TROMETHAMINE 30 MG: 30 INJECTION, SOLUTION INTRAMUSCULAR at 01:19

## 2017-01-27 RX ADMIN — IOHEXOL 60 ML: 240 INJECTION, SOLUTION INTRATHECAL; INTRAVASCULAR; INTRAVENOUS; ORAL at 08:57

## 2017-01-27 RX ADMIN — KETOROLAC TROMETHAMINE 30 MG: 30 INJECTION, SOLUTION INTRAMUSCULAR at 06:43

## 2017-01-27 RX ADMIN — SODIUM CHLORIDE, SODIUM LACTATE, POTASSIUM CHLORIDE, AND CALCIUM CHLORIDE 1000 ML: 600; 310; 30; 20 INJECTION, SOLUTION INTRAVENOUS at 01:22

## 2017-01-27 RX ADMIN — CEFAZOLIN SODIUM 2 G: 2 SOLUTION INTRAVENOUS at 01:20

## 2017-01-27 RX ADMIN — HYDROMORPHONE HYDROCHLORIDE 1 MG: 1 INJECTION, SOLUTION INTRAMUSCULAR; INTRAVENOUS; SUBCUTANEOUS at 02:16

## 2017-01-27 NOTE — NURSE NAVIGATOR
Doing well. UGI results confirmed. Tolerating liquid diet and protein shakes. Denies nausea. Pain - 3.  LUCRECIA output WNL. Adequate urine output. Encouraged to continue to cough, deep breathe and use spirometer every hour while awake. Encouraged to be out of bed ambulating no less than 3 times today. Dressings removed. Incisions dry and intact. Discussed diet and activities after discharge.

## 2017-01-27 NOTE — ROUTINE PROCESS
TRANSFER - IN REPORT:    Verbal report received from [id number] [facility] RN(name) on Veda Ingridnt  being received from [facility]) for routine post - op      Report consisted of patients Situation, Background, Assessment and   Recommendations(SBAR). Information from the following report(s) SBAR, Kardex and MAR was reviewed with the receiving nurse. Opportunity for questions and clarification was provided. Assessment completed upon patients arrival to unit and care assumed.

## 2017-01-27 NOTE — PROGRESS NOTES
Received patient from the PACU staff patient was resting in the bed no distress was noted at this time. 1530 patient assessment was completed at this time. [facility] due to the patient not putting out any urine. Shift summary-  Patient has uneventful shift.

## 2017-01-27 NOTE — DISCHARGE SUMMARY
Discharge Summary    Patient: Meghan Krause               Sex: female          DOA: 1/26/2017         YOB: 1985      Age:  36 y.o.        LOS:  LOS: 1 day                Admit Date: 1/26/2017    Discharge Date: 1/27/2017    Admission Diagnoses: MORBID OBESITY,BMI 47; Morbid obesity with body mass index o*    Discharge Diagnoses:    Problem List as of 1/27/2017  Date Reviewed: 1/26/2017          Codes Class Noted - Resolved    Morbid obesity with body mass index of 50.0-59.9 in adult [facility]) ICD-10-CM: E66.01, Z68.43  ICD-9-CM: 278.01, V85.43  1/26/2017 - Present        Hypertension ICD-10-CM: I10  ICD-9-CM: 401.9  Unknown - Present    Overview Signed 1/23/2017  3:04 PM by ALEXIS De Souza     during preg             Morbid obesity with BMI of 50.0-59.9, adult ([facility] ICD-10-CM: E66.01, Z68.43  ICD-9-CM: 278.01, V85.43  Unknown - Present        Morbid obesity ([facility] ICD-10-CM: E66.01  ICD-9-CM: 278.01  Unknown - Present        Hypothyroidism ICD-10-CM: E03.9  ICD-9-CM: 244.9  Unknown - Present        Anxiety ICD-10-CM: F41.9  ICD-9-CM: 300.00  Unknown - Present        EVI (stress urinary incontinence, female) ICD-10-CM: N39.3  ICD-9-CM: 625.6  Unknown - Present        Snores ICD-10-CM: R06.83  ICD-9-CM: 786.09  Unknown - Present        Irregular menses ICD-10-CM: N92.6  ICD-9-CM: 626.4  Unknown - Present        Arthritic-like pain ICD-10-CM: M25.50  ICD-9-CM: 719.40  Unknown - Present    Overview Signed 1/9/2017  2:46 PM by ALEXIS De Souza     knees                   Discharge Condition: [facility] Course: The patient underwent  laparoscopic sleeve gastrectomy  on 1/26/2017. The patient tolerated the procedure well. Vital signs remained stable and the patient was transferred to  3rd floor surgical unit without complications. The patient remained stable throughout the first night post operatively with stable vital signs and adequate urine output and pain control.  Pain was controlled with Dilaudid IV and IV Tylenol . The patient on the first morning post operative was transferred to the radiology suite where they underwent a gastrograffin UGI study which showed no evidence of a leak or stricture. The drain was discontinued on POD # 1 and the patient was started on a bariatric liquid diet with protein shakes. The patient progressed throughout the day and was ambulating well and tolerating their diet. They were therefore discharged home with instructions to notify me with any issues that may arise. Significant Diagnostic Studies:   Recent Labs      01/26/17 2125   HGB  11.8*     Recent Labs      01/26/17 2125   HCT  35.5       Current Discharge Medication List      CONTINUE these medications which have NOT CHANGED    Details   citalopram (CELEXA) 40 mg tablet Take 40 mg by mouth every evening. LEVOTHYROXINE SODIUM (SYNTHROID PO) Take  by mouth. oxyCODONE-acetaminophen (PERCOCET) 5-325 mg per tablet Take 1 Tab by mouth every four (4) hours as needed for Pain. Max Daily Amount: 6 Tabs. Qty: 30 Tab, Refills: 0             Activity: activity as tolerated with no heavy lifting of greater than 20 pounds. No anti- inflammatory medications. Use stool softeners at home as needed while taking pain medications since they are constipating. Diet: Bariatric liquid diet    Wound Care: Keep wound clean and dry, Reinforce dressing PRN and ice to area for comfort. Do not get wound wet for 2 days.     Follow-up: 14 days with Dr Aleixs Luna M.D

## 2017-01-27 NOTE — PROGRESS NOTES
Bariatric Surgery                POD #1    Visit Vitals    /63 (BP 1 Location: Left arm, BP Patient Position: At rest)    Pulse 78    Temp 98.3 °F (36.8 °C)    Resp 18    Ht 5' 9\" (1.753 m)    Wt (!) 173.9 kg (383 lb 6.4 oz)    LMP 01/02/2017 (Approximate)    SpO2 93%    BMI 56.62 kg/m2     Patient has minimal complaints of pain, minimal nausea noted     Exam:  Appears well in no distress  Lungs- clear bilaterally  Abd - soft, incisions look good without erythema           LUCRECIA with minimal serosanguinous output  Extremities- no new edema or swelling    UGI - no obstrustion or leak    Data Review:    Labs: Results:       Chemistry No results for input(s): GLU, NA, K, CL, CO2, BUN, CREA, CA, AGAP, BUCR, TBIL, GPT, AP, TP, ALB, GLOB, AGRAT in the last 72 hours. CBC w/Diff Recent Labs      01/26/17   2125   WBC  11.9   RBC  4.18*   HGB  11.8*   HCT  35.5   PLT  220   GRANS  73   LYMPH  20*   EOS  0      Coagulation No results for input(s): PTP, INR, APTT in the last 72 hours. No lab exists for component: INREXT    Liver Enzymes No results for input(s): TP, ALB, TBIL, AP, SGOT, GPT in the last 72 hours. No lab exists for component: DBIL       Assessment/Plan: S/P  laparoscopic sleeve gastrectomy - doing well without any issues    1. Start bariatric diet and protein shakes  2. D/C IV pain meds and start PO pain meds  3. D/C LUCRECIA drain  4. Likley PM D/C if  Cont ok and tolerate PO

## 2017-01-27 NOTE — DISCHARGE INSTRUCTIONS
[facility]  Torsten Paz. Karissa Parker M.D., F.A.C.S.  One [facility]. [address], [address] [place] [address]  Office: [phone]    Fax:  [phone]    Discharge Instruction for Gastric Bypass / Sleeve Gastrectomy Patients    Diet:   Continue with the liquid diet until you are seen in the office. Make sure you sip fluids all day. Goal for total fluid intake is at least 64 ounces per day. Aim for  Gms of protein every day. Activity:   Walking is encouraged. Rest when you are tired.  You may shower.  You may climb stairs. Take your time.  No lifting more than 10-15 lbs.  If you feel discomfort during an activity, rest.   Do not drive for 1 week or until off of all narcotics. Wound Care:   Clean incisions with soap and water when in the shower. Pat dry.  Leave steri-strips on until they fall off.  A small amount of drainage may be present from the incisions. Contact the office if you notice an increase in drainage, an odor, increased redness, or fever > 100.5. Medications:   You will receive a prescription for pain medication and Prilosec at the time of discharge.  For upset stomach you may take over the counter medications such as Maalox, Mylanta, Pepcid, Zantac, or Tagamet.  Gas X works very well for bloating when eating or drinking   You may take Tylenol   Non-aspirin based arthritis medications may be resumed    Take a childrens or adult chewable multivitamin.  Milk of Magnesia will help with constipation.  It is fine to take your usual home medications. Blood pressure medications should be continued after surgery. Diabetic medications can frequently be reduced very quickly after surgery. Diabetics should continue to monitor blood sugars frequently after surgery and contact the prescribing physician for any questions.   Follow-Up Appointment:   If you do not already have a follow-up appointment scheduled, contact the office in the next few days to obtain one. It is usually scheduled for 10-14 days after you surgery date. Office phone number:  [phone].         REV  01/2017    Patient armband removed and shredded

## 2017-01-27 NOTE — ROUTINE PROCESS
Bedside and Verbal shift change report given to NOÉ Bourgeois RN (oncoming nurse) by MARY Washington RN (offgoing nurse). Report included the following information SBAR, Kardex, Intake/Output and MAR.

## 2017-01-27 NOTE — PROGRESS NOTES
conducted an initial visit with Meghan Villalobos, who is a 36 y.o.,female. The  provided the following Interventions:  Initiated a relationship of care and support. Offered prayer and assurance of continued prayers on patient's behalf. Plan:  Chaplains will continue to follow and will provide pastoral care on an as needed/requested basis.  recommends bedside caregivers page  on duty if patient shows signs of acute spiritual or emotional distress.     LILIAN Robbins.Div.  [phone] - Office

## 2017-01-27 NOTE — PROGRESS NOTES
Shift summary: Pt A&Ox4, up ad marina. Ambulating the hallway. Abdominal pain managed with IV Dilaudid. Escobedo draining clear, yellow, urine. Escobedo removed this morning. Pt appears to be resting comfortably. No visible signs of distress.      Patient Vitals for the past 12 hrs:   Temp Pulse Resp BP SpO2   01/27/17 0353 98.3 °F (36.8 °C) 78 18 135/63 93 %   01/26/17 2339 98.7 °F (37.1 °C) 85 20 127/63 96 %   01/26/17 1937 98.3 °F (36.8 °C) (!) 102 18 134/70 98 %       Date 01/26/17 0700 - 01/27/17 0659 01/27/17 0700 - 01/28/17 0659   Shift 5633-4446 7297-1317 24 Hour Total 9558-2948 0889-0866 24 Hour Total   I  N  T  A  K  E   I.V.  (mL/kg/hr) 3000  (1.5) 2560  (1.2) 5560  (1.3)         I.V.  1000 1000         Volume (lactated ringers infusion) 3000  3000         Volume (lactated ringers infusion)  1460 1460         Volume (acetaminophen (OFIRMEV) infusion 1,000 mg)  100 100       Shift Total  (mL/kg) 3000  (17.9) 2560  (14.7) 5560  (32)      O  U  T  P  U  T   Urine  (mL/kg/hr) 410  (0.2) 1100  (0.5) 1510  (0.4) 300  300      Urine Output 85  85         Urine Output (mL) ([REMOVED] Urinary Catheter 01/26/17 Escobedo) 325 1100 1425 300  300    Drains 25 10 35         Output (ml) (Elvis-Daley Drain 01/26/17 Left Abdomen) 25 10 35       Blood 25  25         Estimated Blood Loss 25  25       Shift Total  (mL/kg) 460  (2.7) 1110  (6.4) 1570  (9) 300  (1.7)  300  (1.7)   NET 2540 1450 3990 -300  -300   Weight (kg) 167.6 173.9 173.9 173.9 173.9 173.9

## 2017-01-27 NOTE — PROGRESS NOTES
Readmission Risk Assessment: Low Risk and MSSP/[facility] Help [facility] patients    RRAT Score: 1 - 12 (11)    Initial Assessment: Chart reviewed, noted 36 yr old female with [employer] admitted from home for a scheduled procedure; noted pt is POD # 1 s/p laparoscopic gastric bypass, wedge liver bx, intraop endoscopy with bx; noted pt is ambulating well and tolerating her diet; noted pt expects d/c to private residence. No discharge needs noted. CMgr will be available should d/c needs arise. Emergency Contact:     Pertinent Medical Hx: see H&P  PCP/Specialists: Alireza   [facility] Services:     DME:     Low Risk Care Transition Plan:  1. Evaluate for [facility] or [address] coordination of resources  2. Involve patient/caregiver in assessment, planning, education and implement of intervention. 3. CM daily patient care huddles/interdisciplinary rounds. 4. PCP/Specialist appointment within 7 - 10 days made prior to discharge. 5. Facilitate transportation and logistics for follow-up appointments. 6. Handoff to [address] Nurse Navigator or PCP practice.

## 2017-01-28 NOTE — PROGRESS NOTES
Shift summary: pt pleasant and cooperative with care. Rec' prn pain medication x2, effective. Pt up ad marina. Tolerating bariatric full liquid diet well. Pt discharged home. Dual AVS reviewed with Carlie Carroll RN. All medications reviewed individually with patient. Opportunities for questions and concerns provided. Patient discharged via (mode of transport ie. Car, ambulance or air transport) car. Patient's arm band appropriately discarded.

## 2017-02-03 ENCOUNTER — NURSE NAVIGATOR (OUTPATIENT)
Dept: SURGERY | Age: 32
End: 2017-02-03

## 2017-02-03 NOTE — PROGRESS NOTES
Attempted to reach [facility] to discuss post-op recovery. No answer. Left message to call with any questions or concerns. Reminded of office appointment next week.

## 2017-02-09 ENCOUNTER — TELEPHONE (OUTPATIENT)
Dept: SURGERY | Age: 32
End: 2017-02-09

## 2017-02-10 ENCOUNTER — NURSE NAVIGATOR (OUTPATIENT)
Dept: SURGERY | Age: 32
End: 2017-02-10

## 2017-02-10 NOTE — PROGRESS NOTES
Spoke with Desire Breen today regarding post-operative progression. States incisions are healing well, no drainage or redness. Tolerating liquid diet and protein shakes well. Discussed advancement to soft/pureed diet. Instructed to refer to book and shopping list.  Discussed varieties of appropriate foods. Pain = soreness. Denies  nausea. Reminded of 2 week follow up appointment in office. Instructed to call office if any further questions or concerns.

## 2017-02-15 ENCOUNTER — OFFICE VISIT (OUTPATIENT)
Dept: SURGERY | Age: 32
End: 2017-02-15

## 2017-02-15 VITALS
OXYGEN SATURATION: 98 % | BODY MASS INDEX: 43.4 KG/M2 | WEIGHT: 293 LBS | HEART RATE: 96 BPM | HEIGHT: 69 IN | SYSTOLIC BLOOD PRESSURE: 127 MMHG | DIASTOLIC BLOOD PRESSURE: 78 MMHG

## 2017-02-15 DIAGNOSIS — K90.9 INTESTINAL MALABSORPTION, UNSPECIFIED TYPE: Primary | ICD-10-CM

## 2017-02-15 DIAGNOSIS — R10.13 EPIGASTRIC PRESSURE: ICD-10-CM

## 2017-02-15 DIAGNOSIS — Z98.84 S/P BARIATRIC SURGERY: ICD-10-CM

## 2017-02-15 DIAGNOSIS — E88.89 KETOSIS (HCC): ICD-10-CM

## 2017-02-15 RX ORDER — RANITIDINE 300 MG/1
300 TABLET ORAL DAILY
Qty: 30 TAB | Refills: 3 | Status: SHIPPED | OUTPATIENT
Start: 2017-02-15 | End: 2018-03-22 | Stop reason: ALTCHOICE

## 2017-02-15 RX ORDER — GLUCOSAMINE/CHONDR SU A SOD 750-600 MG
TABLET ORAL
COMMUNITY
End: 2019-09-06

## 2017-02-15 RX ORDER — CYANOCOBALAMIN 1000 UG/ML
1000 INJECTION, SOLUTION INTRAMUSCULAR; SUBCUTANEOUS
Qty: 3 ML | Refills: 4 | Status: SHIPPED | OUTPATIENT
Start: 2017-02-15 | End: 2018-03-22 | Stop reason: SDUPTHER

## 2017-02-15 NOTE — MR AVS SNAPSHOT
Visit Information Date & Time Provider Department Dept. Phone Encounter #  
 2/15/2017  2:30 PM Hilda Horvath NP [facility] [place] [phone] [id number] Your Appointments 2/23/2017  1:30 PM  
[facility] [place] ([address]) Appt Note: 1 mo po GB  
 [address] [facility] [address]  
[phone]  
  
   
 [address] [place] [address]  
  
    
 2/23/2017  2:00 PM  
[facility] ([address]) Appt Note: 1 mo po GB  
 [address] [facility] [address]  
  
   
 [address] [place] [address] Upcoming Health Maintenance Date Due DTaP/Tdap/Td series (1 - Tdap) 10/12/2008 PAP AKA CERVICAL CYTOLOGY 10/12/2008 INFLUENZA AGE 9 TO ADULT 8/1/2016 Allergies as of 2/15/2017  Review Complete On: 2/15/2017 By: Hilda Horvath NP No Known Allergies Current Immunizations  Reviewed on 1/27/2017 No immunizations on file. Not reviewed this visit Vitals BP Pulse Height(growth percentile) Weight(growth percentile) LMP SpO2  
 127/78 (BP 1 Location: Right arm, BP Patient Position: Sitting) 96 5' 9\" (1.753 m) 344 lb (156 kg) 01/27/2017 98% BMI OB Status Smoking Status 50.8 kg/m2 Having regular periods Never Smoker BMI and BSA Data Body Mass Index Body Surface Area 50.8 kg/m 2 2.76 m 2 Preferred Pharmacy Pharmacy Name Phone F[id number] [place] [address] [facility] [place] [phone] Your Updated Medication List  
  
   
This list is accurate as of: 2/15/17  3:22 PM.  Always use your most recent med list.  
  
  
  
  
 Biotin 2,500 mcg Cap Take  by mouth. CeleXA 40 mg tablet Generic drug:  citalopram  
Take 40 mg by mouth every evening. cyanocobalamin 1,000 mcg/mL injection Commonly known as:  VITAMIN B12  
1 mL by SubCUTAneous route every thirty (30) days. multivitamin with iron chewable tablet Commonly known as:  Re Caroli Take 1 Tab by mouth daily. PROBIOTIC 4X 10-15 mg Tbec Generic drug:  B.infantis-B.ani-B.long-B.bifi Take  by mouth. raNITIdine 300 mg tablet Commonly known as:  ZANTAC Take 1 Tab by mouth daily. SYNTHROID PO Take  by mouth. Syringe with Needle (Disp) 3 mL 25 x 5/8\" Syrg 1 mL by SubCUTAneous route every thirty (30) days. Prescriptions Sent to Pharmacy Refills  
 cyanocobalamin (VITAMIN B12) 1,000 mcg/mL injection 4 Si mL by SubCUTAneous route every thirty (30) days. Class: Normal  
 Pharmacy: [address] [place] [address] Ph #: [phone] Route: SubCUTAneous Syringe with Needle, Disp, 3 mL 25 x 5/8\" syrg 0 Si mL by SubCUTAneous route every thirty (30) days. Class: Normal  
 Pharmacy: [address] [place] [address] Ph #: [phone] Route: SubCUTAneous  
 raNITIdine (ZANTAC) 300 mg tablet 3 Sig: Take 1 Tab by mouth daily. Class: Normal  
 Pharmacy: [address] [place] [address] Ph #: [phone] Route: Oral  
  
Patient Instructions Continue focus on hydration. May advance to soft diet. Please review material in your binder. Remember - your motto is \"soft foods with protein\". Eat regularly. Continue to use protein shakes. Remember to eat slowly & not to drink fluids with your meals. Increase activity as able - cardio (walking) only. Continue to take multi-vitamins. Continue regular follow-up with your PCP. Return to office in 2 weeks for your next appointment. Call the office if you have any questions or concerns. Start zantac. Let us know if symptoms persist 
 
 
 
  
Walking for Exercise: Care Instructions Your Care Instructions Walking is one of the easiest ways to get the exercise you need for good health. A brisk, 30-minute walk each day can help you feel better and have more energy. It can help you lower your risk of disease. Walking can help you keep your bones strong and your heart healthy. Check with your doctor before you start a walking plan if you have heart problems, other health issues, or you have not been active in a long time. Follow your doctor's instructions for safe levels of exercise. Follow-up care is a key part of your treatment and safety. Be sure to make and go to all appointments, and call your doctor if you are having problems. It's also a good idea to know your test results and keep a list of the medicines you take. How can you care for yourself at home? Getting started · Start slowly and set a short-term goal. For example, walk for 5 or 10 minutes every day. · Bit by bit, increase the amount you walk every day. Try for at least 30 minutes on most days of the week. You also may want to swim, bike, or do other activities. · If finding enough time is a problem, it is fine to be active in blocks of 10 minutes or more throughout your day and week. · To get the heart-healthy benefits of walking, you need to walk briskly enough to increase your heart rate and breathing, but not so fast that you cannot talk comfortably. · Wear comfortable shoes that fit well and provide good support for your feet and ankles. Staying with your plan · After you've made walking a habit, set a longer-term goal. You may want to set a goal of walking briskly for longer or walking farther. Experts say to do 2½ hours of moderate activity a week. A faster heartbeat is what defines moderate-level activity. · To stay motivated, walk with friends, coworkers, or pets. · Use a phone ayana or pedometer to track your steps each day. Set a goal to increase your steps. Once you get there, set a higher goal. Adults should work toward 10,000 steps a day. Children who are 10to 15years old need more stepsat least 12,000 for girls and at least 15,000 for boys. · If the weather keeps you from walking outside, go for walks at the mall with a friend. Local schools and churches may have indoor gyms where you can walk. Fitting a walk into your workday · Park several blocks away from work, or get off the bus a few stops early. · Use the stairs instead of the elevator, at least for a few floors. · Suggest holding meetings with colleagues during a walk inside or outside the building. · Use the restroom that is the farthest from your desk or workstation. · Use your morning and afternoon breaks to take quick 15-minute walks. Staying safe · Know your surroundings. Walk in a well-lighted, safe place. If it is dark, walk with a partner. Wear light-colored clothing. If you can, buy a vest or jacket that reflects light. · Carry a cell phone for emergencies. · Drink plenty of water. Take a water bottle with you when you walk. This is very important if it is hot out. · Be careful not to slip on wet or icy ground. You can buy \"grippers\" for your shoes to help keep you from slipping. · Pay attention to your walking surface. Use sidewalks and paths. · If you have breathing problems like asthma or COPD, ask your doctor when it is safe for you to walk outdoors. Cold, dry air, smog, pollen, or other things in the air could cause breathing problems. Where can you learn more? Go to http://cristina-melodie.info/. Enter R159 in the search box to learn more about \"Walking for Exercise: Care Instructions. \" Current as of: May 27, 2016 Content Version: 11.1 © 3414-5691 [employer].  Care instructions adapted under license by [address] (which disclaims liability or warranty for this information). If you have questions about a medical condition or this instruction, always ask your healthcare professional. [address] any warranty or liability for your use of this information. Introducing [facility]! Candelaria Hernandez introduces Activ[employer] patient portal. Now you can access parts of your medical record, email your doctor's office, and request medication refills online. 1. In your internet browser, go to [email or web address]. [employer] 2. Click on the First Time User? Click Here link in the Sign In box. You will see the New Member Sign Up page. 3. Enter your Activ[employer] Access Code exactly as it appears below. You will not need to use this code after youve completed the sign-up process. If you do not sign up before the expiration date, you must request a new code. · [employer] Access Code: [email or web address] Expires: 4/17/2017 10:09 AM 
 
4. Enter the last four digits of your Social Security Number (xxxx) and Date of Birth (mm/dd/yyyy) as indicated and click Submit. You will be taken to the next sign-up page. 5. Create a Activ[employer] ID. This will be your [employer] login ID and cannot be changed, so think of one that is secure and easy to remember. 6. Create a [employer] password. You can change your password at any time. 7. Enter your Password Reset Question and Answer. This can be used at a later time if you forget your password. 8. Enter your e-mail address. You will receive e-mail notification when new information is available in [address] 9. Click Sign Up. You can now view and download portions of your medical record. 10. Click the Download Summary menu link to download a portable copy of your medical information. If you have questions, please visit the Frequently Asked Questions section of the [email or web address] website.  Remember, Activ[employer]com is NOT to be used for urgent needs. For medical emergencies, dial 911. Now available from your iPhone and Android! Please provide this summary of care documentation to your next provider. Your primary care clinician is listed as Phys Other. If you have any questions after today's visit, please call [phone].

## 2017-02-15 NOTE — PROGRESS NOTES
Subjective:     Myrna Tinajero  is a 36 y.o. female who presents for follow-up about 2.5 weeks following laparoscopic gastric bypass surgery . She has lost a total of 29 pounds since surgery. Body mass index is 50.8 kg/(m^2). .    Surgery related complication: NA.   Liver bx report reviewed with pt. Stomach bx report reviewed with pt. She is tolerating liquids without difficulty and was advanced to soft foods 2/10/17 by [employer] RN which she has also tolerated. She reports ketotic symptoms and denies vomiting and abdominal pain, but does report some epigastric pressure and fullness. Not taking omeprazole. Fluid intake:good  Protein intake:good  Taking prescribed multivitamin. The patient's activity level: moderately active. Online college - psychology major. Changes in her medical history and medications have been reviewed. Patient Active Problem List   Diagnosis Code    Morbid obesity with BMI of 50.0-59.9, adult ([facility]) E66.01, Z68.43    [facility] E66.01    Hypothyroidism E03.9    Anxiety F41.9    EVI (stress urinary incontinence, female) N39.3    Snores R06.83    Irregular menses N92.6    Arthritic-like pain M25.50    Hypertension I10    Morbid obesity with body mass index of 50.0-59.9 in adult ([facility] E66.01, Z68.43     Past Medical History   Diagnosis Date    Anxiety     Arthritic-like pain      knees    Hypertension      during preg    Hypothyroidism     Irregular menses     Morbid obesity ([facility]     Morbid obesity with BMI of 50.0-59.9, adult ([facility])     Snores     EVI (stress urinary incontinence, female)      Past Surgical History   Procedure Laterality Date    Dilation and curettage       X 2 both for miscarriage    Hx lap cholecystectomy       Current Outpatient Prescriptions   Medication Sig Dispense Refill    multivitamin with iron (FLINTSTONES) chewable tablet Take 1 Tab by mouth daily.  Biotin 2,500 mcg cap Take  by mouth.       B.infantis-B.ani-B.long-B.bifi (PROBIOTIC 4X) 10-15 mg TbEC Take  by mouth.  cyanocobalamin (VITAMIN B12) 1,000 mcg/mL injection 1 mL by SubCUTAneous route every thirty (30) days. 3 mL 4    Syringe with Needle, Disp, 3 mL 25 x 5/8\" syrg 1 mL by SubCUTAneous route every thirty (30) days. 15 Syringe 0    raNITIdine (ZANTAC) 300 mg tablet Take 1 Tab by mouth daily. 30 Tab 3    citalopram (CELEXA) 40 mg tablet Take 40 mg by mouth every evening.  LEVOTHYROXINE SODIUM (SYNTHROID PO) Take  by mouth. Objective:     Visit Vitals    /78 (BP 1 Location: Right arm, BP Patient Position: Sitting)    Pulse 96    Ht 5' 9\" (1.753 m)    Wt 156 kg (344 lb)    LMP 01/27/2017    SpO2 98%    BMI 50.8 kg/m2        Physical Exam:    General:  alert, cooperative, no distress, appears stated age   Heart:  Regular rate and rhythm. .                     Lungs:   Lungs CTA   Abdomen:   abdomen is soft without significant tenderness, masses, organomegaly or guarding; Active bowel sounds all 4 quadrants. No hernia present. Incisions: healing well       Assessment:     1. History of Morbid obesity, status post  laparoscopic gastric bypass surgery. Doing well. 2.  Ketosis  3. Epigastric pressure    Plan:     1. Discussed her ketosis. To continue focus on hydration. 2. May continue soft phase diet. Reminded to measure portions, continue high protein, low carbohydrate diet. Reminded to eat regularly, to eat slowly & not to drink with meals. Discussed with pt. Pt verbalized understanding. 3.   Reminded about importance of taking vitamin supplements. 4.  To start cardio exercise. 5.  To continue current rx. To continue follow-up with PCP. 6.  Discussed her epigastric pressure. To eat slowly. Will add zantac as omeprazole contraindicated with celexa. To notify office if symptoms persist.  7.   I have discussed this plan and education material with patient. Pt verbalized understanding.   8.   To follow-up in 2 week(s). To call if questions/concerns prior to office visit. Ms. Irving Arambula has a reminder for a \"due or due soon\" health maintenance. I have asked that she contact her primary care provider for follow-up on this health maintenance.

## 2017-02-15 NOTE — PATIENT INSTRUCTIONS
Continue focus on hydration. May advance to soft diet. Please review material in your binder. Remember - your motto is \"soft foods with protein\". Eat regularly. Continue to use protein shakes. Remember to eat slowly & not to drink fluids with your meals. Increase activity as able - cardio (walking) only. Continue to take multi-vitamins. Continue regular follow-up with your PCP. Return to office in 2 weeks for your next appointment. Call the office if you have any questions or concerns. Start zantac. Let us know if symptoms persist           Walking for Exercise: Care Instructions  Your Care Instructions  Walking is one of the easiest ways to get the exercise you need for good health. A brisk, 30-minute walk each day can help you feel better and have more energy. It can help you lower your risk of disease. Walking can help you keep your bones strong and your heart healthy. Check with your doctor before you start a walking plan if you have heart problems, other health issues, or you have not been active in a long time. Follow your doctor's instructions for safe levels of exercise. Follow-up care is a key part of your treatment and safety. Be sure to make and go to all appointments, and call your doctor if you are having problems. It's also a good idea to know your test results and keep a list of the medicines you take. How can you care for yourself at home? Getting started  · Start slowly and set a short-term goal. For example, walk for 5 or 10 minutes every day. · Bit by bit, increase the amount you walk every day. Try for at least 30 minutes on most days of the week. You also may want to swim, bike, or do other activities. · If finding enough time is a problem, it is fine to be active in blocks of 10 minutes or more throughout your day and week.   · To get the heart-healthy benefits of walking, you need to walk briskly enough to increase your heart rate and breathing, but not so fast that you cannot talk comfortably. · Wear comfortable shoes that fit well and provide good support for your feet and ankles. Staying with your plan  · After you've made walking a habit, set a longer-term goal. You may want to set a goal of walking briskly for longer or walking farther. Experts say to do 2½ hours of moderate activity a week. A faster heartbeat is what defines moderate-level activity. · To stay motivated, walk with friends, coworkers, or pets. · Use a phone ayana or pedometer to track your steps each day. Set a goal to increase your steps. Once you get there, set a higher goal. Adults should work toward 10,000 steps a day. Children who are 10to 15years old need more steps--at least 12,000 for girls and at least 15,000 for boys. · If the weather keeps you from walking outside, go for walks at the mall with a friend. Local schools and churches may have indoor gyms where you can walk. Fitting a walk into your workday  · Park several blocks away from work, or get off the bus a few stops early. · Use the stairs instead of the elevator, at least for a few floors. · Suggest holding meetings with colleagues during a walk inside or outside the building. · Use the restroom that is the farthest from your desk or workstation. · Use your morning and afternoon breaks to take quick 15-minute walks. Staying safe  · Know your surroundings. Walk in a well-lighted, safe place. If it is dark, walk with a partner. Wear light-colored clothing. If you can, buy a vest or jacket that reflects light. · Carry a cell phone for emergencies. · Drink plenty of water. Take a water bottle with you when you walk. This is very important if it is hot out. · Be careful not to slip on wet or icy ground. You can buy \"grippers\" for your shoes to help keep you from slipping. · Pay attention to your walking surface. Use sidewalks and paths.   · If you have breathing problems like asthma or COPD, ask your doctor when it is safe for you to walk outdoors. Cold, dry air, smog, pollen, or other things in the air could cause breathing problems. Where can you learn more? Go to http://cristina-melodie.info/. Enter R159 in the search box to learn more about \"Walking for Exercise: Care Instructions. \"  Current as of: May 27, 2016  Content Version: 11.1  © 9531-0963 [employer]. Care instructions adapted under license by [employer] (which disclaims liability or warranty for this information). If you have questions about a medical condition or this instruction, always ask your healthcare professional. [place] [address] any warranty or liability for your use of this information.

## 2017-02-22 ENCOUNTER — TELEPHONE (OUTPATIENT)
Dept: SURGERY | Age: 32
End: 2017-02-22

## 2017-02-22 NOTE — TELEPHONE ENCOUNTER
Jordan Whalen please advise:  1/26/17- Barbara GBP  Patient is requesting a telephone appt. to advance diet. Pt has rescheduled office visit but is available by phone in the afternoons.

## 2017-02-23 ENCOUNTER — TELEPHONE (OUTPATIENT)
Dept: SURGERY | Age: 32
End: 2017-02-23

## 2017-03-01 ENCOUNTER — OFFICE VISIT (OUTPATIENT)
Dept: SURGERY | Age: 32
End: 2017-03-01

## 2017-03-01 VITALS
OXYGEN SATURATION: 99 % | WEIGHT: 293 LBS | DIASTOLIC BLOOD PRESSURE: 75 MMHG | BODY MASS INDEX: 43.4 KG/M2 | HEIGHT: 69 IN | SYSTOLIC BLOOD PRESSURE: 120 MMHG | HEART RATE: 80 BPM

## 2017-03-01 VITALS — HEIGHT: 69 IN | WEIGHT: 293 LBS | BODY MASS INDEX: 43.4 KG/M2

## 2017-03-01 DIAGNOSIS — Z98.84 S/P BARIATRIC SURGERY: ICD-10-CM

## 2017-03-01 DIAGNOSIS — K90.9 INTESTINAL MALABSORPTION, UNSPECIFIED TYPE: Primary | ICD-10-CM

## 2017-03-01 DIAGNOSIS — R10.13 EPIGASTRIC PRESSURE: ICD-10-CM

## 2017-03-01 DIAGNOSIS — Z98.84 S/P BARIATRIC SURGERY: Primary | ICD-10-CM

## 2017-03-01 NOTE — PATIENT INSTRUCTIONS
May advance diet to solid phase. Eat regularly. Remember to measure portions, to keep the focus on increasing your protein & keeping your carbs low. Continue the use of protein shakes. To follow recommendations of dietician. Stay hydrated! Eat regularly, eat slowly & do not drink with your meals. Vitamins to be taken include your multivitamin, B12, calcium citrate, Vit D & Bcomplex. Refer to your notebook & handouts for dosages. Continue to increase cardio activity. May add resistance exercises in 2 weeks. Continue follow-up with your PCP. Return to this office in 1 month. Call if questions/concerns prior to your office visit. Body Mass Index: Care Instructions  Your Care Instructions    Body mass index (BMI) can help you see if your weight is raising your risk for health problems. It uses a formula to compare how much you weigh with how tall you are. A BMI between 18.5 and 24.9 is considered healthy. A BMI between 25 and 29.9 is considered overweight. A BMI of 30 or higher is considered obese. If your BMI is in the normal range, it means that you have a lower risk for weight-related health problems. If your BMI is in the overweight or obese range, you may be at increased risk for weight-related health problems, such as high blood pressure, heart disease, stroke, arthritis or joint pain, and diabetes. BMI is just one measure of your risk for weight-related health problems. You may be at higher risk for health problems if you are not active, you eat an unhealthy diet, or you drink too much alcohol or use tobacco products. Follow-up care is a key part of your treatment and safety. Be sure to make and go to all appointments, and call your doctor if you are having problems. It's also a good idea to know your test results and keep a list of the medicines you take. How can you care for yourself at home? · Practice healthy eating habits.  This includes eating plenty of fruits, vegetables, whole grains, lean protein, and low-fat dairy. · Get at least 30 minutes of exercise 5 days a week or more. Brisk walking is a good choice. You also may want to do other activities, such as running, swimming, cycling, or playing tennis or team sports. · Do not smoke. Smoking can increase your risk for health problems. If you need help quitting, talk to your doctor about stop-smoking programs and medicines. These can increase your chances of quitting for good. · Limit alcohol to 2 drinks a day for men and 1 drink a day for women. Too much alcohol can cause health problems. If you have a BMI higher than 25  · Your doctor may do other tests to check your risk for weight-related health problems. This may include measuring the distance around your waist. A waist measurement of more than 40 inches in men or 35 inches in women can increase the risk of weight-related health problems. · Talk with your doctor about steps you can take to stay healthy or improve your health. You may need to make lifestyle changes to lose weight and stay healthy, such as changing your diet and getting regular exercise. Where can you learn more? Go to http://cristina-melodie.info/. Enter S176 in the search box to learn more about \"Body Mass Index: Care Instructions. \"  Current as of: February 16, 2016  Content Version: 11.1  © 3228-4701 [employer]. Care instructions adapted under license by [employer] (which disclaims liability or warranty for this information). If you have questions about a medical condition or this instruction, always ask your healthcare professional. [place] [address] any warranty or liability for your use of this information.

## 2017-03-01 NOTE — PROGRESS NOTES
Subjective:     Milta Duane  is a 36 y.o. female who presents for follow-up about 1 month following laparoscopic gastric bypass surgery. She has lost a total of 36 pounds since surgery. Body mass index is 49.77 kg/(m^2). Has lost 16% of EBW. Surgery related complication: NA     She is tolerating soft foods without difficulty and denies vomiting and abdominal pain. Zantac effective in reducing previously documented epigastric fullness. Fluid intake: good  Protein intake: fair - food + protein shakes  Diet recall indicates she is eating irregularly. Taking recommended multivitamins. Pt has appt with dietician after this office visit. The patient's exercise level: moderately active. In school. Has 5yo. Changes in her medical history and medications have been reviewed. Patient Active Problem List   Diagnosis Code    Morbid obesity with BMI of 50.0-59.9, adult ([facility]) E66.01, Z68.43    Morbid obesity ([address] E66.01    Hypothyroidism E03.9    Anxiety F41.9    EVI (stress urinary incontinence, female) N39.3    Snores R06.83    Irregular menses N92.6    Arthritic-like pain M25.50    Hypertension I10    Morbid obesity with body mass index of 50.0-59.9 in adult ([facility]) E66.01, Z68.43    S/P bariatric surgery Z98.84    Intestinal malabsorption K90.9     Past Medical History:   Diagnosis Date    Anxiety     Arthritic-like pain     knees    Hypertension     during preg    Hypothyroidism     Irregular menses     Morbid obesity ([address]     Morbid obesity with BMI of 50.0-59.9, adult ([facility])     Snores     EVI (stress urinary incontinence, female)      Past Surgical History:   Procedure Laterality Date    DILATION AND CURETTAGE      X 2 both for miscarriage    HX LAP CHOLECYSTECTOMY       Current Outpatient Prescriptions   Medication Sig Dispense Refill    CALCIUM CITRATE/VITAMIN D3 (CALCIUM CITRATE + PO) Take  by mouth.       multivitamin with iron (FLINTSTONES) chewable tablet Take 1 Tab by mouth two (2) times a day.  Biotin 2,500 mcg cap Take  by mouth.  B.infantis-B.ani-B.long-B.bifi (PROBIOTIC 4X) 10-15 mg TbEC Take  by mouth.  cyanocobalamin (VITAMIN B12) 1,000 mcg/mL injection 1 mL by SubCUTAneous route every thirty (30) days. 3 mL 4    Syringe with Needle, Disp, 3 mL 25 x 5/8\" syrg 1 mL by SubCUTAneous route every thirty (30) days. 15 Syringe 0    raNITIdine (ZANTAC) 300 mg tablet Take 1 Tab by mouth daily. 30 Tab 3    citalopram (CELEXA) 40 mg tablet Take 40 mg by mouth every evening.  LEVOTHYROXINE SODIUM (SYNTHROID PO) Take  by mouth. Objective:     Visit Vitals    /75 (BP 1 Location: Left arm, BP Patient Position: Sitting)    Pulse 80    Ht 5' 9\" (1.753 m)    Wt 152.9 kg (337 lb)    LMP 01/27/2017    SpO2 99%    BMI 49.77 kg/m2        Physical Exam:    General:  alert, cooperative, no distress, appears stated age   Heart:  Regular rate and rhythm. Lungs CTA. Respiratory effort appropriate. Abdomen:   abdomen is soft without significant tenderness, masses, organomegaly or guarding; Active bowel sounds all 4 quadrants. No hernia present. Incisions: healing well       Assessment:     1. History of Morbid obesity, status post  laparoscopic gastric bypass surgery. Doing well. 2.  Epigastric pressure - controlled with zantac    Plan:     1. To continue focus on hydration. 2. May advance diet to solid phase. Reminded to measure portions, continue high protein, low carbohydrate diet. Reminded to eat regularly, to eat slowly & not to drink with meals. Diet reviewed with pt & handouts given. Pt verbalized understanding. 3. Reminded of importance of vitamin supplements. 4. To continue cardio exercise - adding resistance exercises in 2 weeks. Discussed with pt.  5. To continue current rx. To continue follow-up with PCP. 6. Encouraged attendance @ support group  7. I have discussed this plan and education material with patient.   Pt verbalized understanding. 8. To follow-up in 1 month(s). To call if questions/concerns prior to office visit. Ms. Francheska Valenzuela has a reminder for a \"due or due soon\" health maintenance. I have asked that she contact her primary care provider for follow-up on this health maintenance.

## 2017-03-01 NOTE — PROGRESS NOTES
Pt given one on one diet education. Appears to have a good understanding of the diet progression, food choices, and dietary/exercise habits for successful weight loss and nourishment one month after surgery. The  material included: post-op diet progression and portion sizes (including low fat, low sugar food recommendations and emphasis on protein foods and protein supplements), good beverage choices, reading a food label, vitamins/minerals required after weight loss surgery, and encouraging dietary and exercise habits that lead to weight loss success. Pt also received a restaurant card, which tells restaurants that the patient had a procedure that decreases the size of their stomach so the restaurant may let them order off the children's menu, the senior's menu, or a smaller portion for a reduced rate.      Martinez Parker RD

## 2017-03-01 NOTE — MR AVS SNAPSHOT
Visit Information Date & Time Provider Department Dept. Phone Encounter #  
 3/1/2017  2:00 PM SARA Cordero [facility] [phone] Follow-up Instructions Return in about 1 month (around 4/1/2017). Follow-up and Disposition History Your Appointments 4/5/2017  8:15 AM  
POST OP with MD Alexis Bolton [facility]) Appt Note: 2 mo po  
 [address] [facility] [address]  
  
   
 [address] [place] [address] Upcoming Health Maintenance Date Due DTaP/Tdap/Td series (1 - Tdap) 10/12/2008 PAP AKA CERVICAL CYTOLOGY 10/12/2008 INFLUENZA AGE 9 TO ADULT 8/1/2016 Allergies as of 3/1/2017  Review Complete On: 3/1/2017 By: Jassi Posadas NP No Known Allergies Current Immunizations  Reviewed on 1/27/2017 No immunizations on file. Not reviewed this visit You Were Diagnosed With   
  
 Codes Comments Intestinal malabsorption, unspecified type    -  Primary ICD-10-CM: K90.9 ICD-9-CM: 579.9 S/P bariatric surgery     ICD-10-CM: Z98.84 ICD-9-CM: V45.86 Epigastric pressure     ICD-10-CM: R10.13 ICD-9-CM: 789.06 Vitals BP  
  
  
  
  
  
 120/75 (BP 1 Location: Left arm, BP Patient Position: Sitting) BMI and BSA Data Body Mass Index Body Surface Area  
 49.77 kg/m 2 2.73 m 2 Preferred Pharmacy Pharmacy Name Phone [id number] - [address] [facility] [phone] Your Updated Medication List  
  
   
This list is accurate as of: 3/1/17  3:02 PM.  Always use your most recent med list.  
  
  
  
  
 Biotin 2,500 mcg Cap Take  by mouth. CALCIUM CITRATE + PO Take  by mouth. CeleXA 40 mg tablet Generic drug:  citalopram  
Take 40 mg by mouth every evening. cyanocobalamin 1,000 mcg/mL injection Commonly known as:  VITAMIN B12  
1 mL by SubCUTAneous route every thirty (30) days. multivitamin with iron chewable tablet Commonly known as:  Rue  Take 1 Tab by mouth two (2) times a day. PROBIOTIC 4X 10-15 mg Tbec Generic drug:  B.infantis-B.ani-B.long-B.bifi Take  by mouth. raNITIdine 300 mg tablet Commonly known as:  ZANTAC Take 1 Tab by mouth daily. SYNTHROID PO Take  by mouth. Syringe with Needle (Disp) 3 mL 25 x 5/8\" Syrg 1 mL by SubCUTAneous route every thirty (30) days. Follow-up Instructions Return in about 1 month (around 4/1/2017). Patient Instructions May advance diet to solid phase. Eat regularly. Remember to measure portions, to keep the focus on increasing your protein & keeping your carbs low. Continue the use of protein shakes. To follow recommendations of dietician. Stay hydrated! Eat regularly, eat slowly & do not drink with your meals. Vitamins to be taken include your multivitamin, B12, calcium citrate, Vit D & Bcomplex. Refer to your notebook & handouts for dosages. Continue to increase cardio activity. May add resistance exercises in 2 weeks. Continue follow-up with your PCP. Return to this office in 1 month. Call if questions/concerns prior to your office visit. Body Mass Index: Care Instructions Your Care Instructions Body mass index (BMI) can help you see if your weight is raising your risk for health problems. It uses a formula to compare how much you weigh with how tall you are. A BMI between 18.5 and 24.9 is considered healthy. A BMI between 25 and 29.9 is considered overweight. A BMI of 30 or higher is considered obese. If your BMI is in the normal range, it means that you have a lower risk for weight-related health problems.  If your BMI is in the overweight or obese range, you may be at increased risk for weight-related health problems, such as high blood pressure, heart disease, stroke, arthritis or joint pain, and diabetes. BMI is just one measure of your risk for weight-related health problems. You may be at higher risk for health problems if you are not active, you eat an unhealthy diet, or you drink too much alcohol or use tobacco products. Follow-up care is a key part of your treatment and safety. Be sure to make and go to all appointments, and call your doctor if you are having problems. It's also a good idea to know your test results and keep a list of the medicines you take. How can you care for yourself at home? · Practice healthy eating habits. This includes eating plenty of fruits, vegetables, whole grains, lean protein, and low-fat dairy. · Get at least 30 minutes of exercise 5 days a week or more. Brisk walking is a good choice. You also may want to do other activities, such as running, swimming, cycling, or playing tennis or team sports. · Do not smoke. Smoking can increase your risk for health problems. If you need help quitting, talk to your doctor about stop-smoking programs and medicines. These can increase your chances of quitting for good. · Limit alcohol to 2 drinks a day for men and 1 drink a day for women. Too much alcohol can cause health problems. If you have a BMI higher than 25 · Your doctor may do other tests to check your risk for weight-related health problems. This may include measuring the distance around your waist. A waist measurement of more than 40 inches in men or 35 inches in women can increase the risk of weight-related health problems. · Talk with your doctor about steps you can take to stay healthy or improve your health. You may need to make lifestyle changes to lose weight and stay healthy, such as changing your diet and getting regular exercise. Where can you learn more? Go to http://cristina-melodie.info/. Enter S176 in the search box to learn more about \"Body Mass Index: Care Instructions. \" Current as of: February 16, 2016 Content Version: 11.1 © 6685-6298 [employer]. Care instructions adapted under license by 1-800-[employer] (which disclaims liability or warranty for this information). If you have questions about a medical condition or this instruction, always ask your healthcare professional. [address] any warranty or liability for your use of this information. Patient Instructions History Introducing [facility]! Chaya Nassar introduces betNOW patient portal. Now you can access parts of your medical record, email your doctor's office, and request medication refills online. 1. In your internet browser, go to [email or web address] [employer] 2. Click on the First Time User? Click Here link in the Sign In box. You will see the New Member Sign Up page. 3. Enter your betNOW Access Code exactly as it appears below. You will not need to use this code after youve completed the sign-up process. If you do not sign up before the expiration date, you must request a new code. · betNOW Access Code: [email or web address] Expires: 4/17/2017 10:09 AM 
 
4. Enter the last four digits of your Social Security Number (xxxx) and Date of Birth (mm/dd/yyyy) as indicated and click Submit. You will be taken to the next sign-up page. 5. Create a betNOW ID. This will be your betNOW login ID and cannot be changed, so think of one that is secure and easy to remember. 6. Create a betNOW password. You can change your password at any time. 7. Enter your Password Reset Question and Answer. This can be used at a later time if you forget your password. 8. Enter your e-mail address. You will receive e-mail notification when new information is available in [address] 9. Click Sign Up. You can now view and download portions of your medical record. 10. Click the Download Summary menu link to download a portable copy of your medical information. If you have questions, please visit the Frequently Asked Questions section of the L[employer] website. Remember, Lytro is NOT to be used for urgent needs. For medical emergencies, dial 911. Now available from your iPhone and Android! Please provide this summary of care documentation to your next provider. Your primary care clinician is listed as Phys Other. If you have any questions after today's visit, please call [phone].

## 2017-03-21 RX ORDER — NYSTATIN 100000 U/G
CREAM TOPICAL 2 TIMES DAILY
Qty: 30 G | Refills: 3 | Status: SHIPPED | OUTPATIENT
Start: 2017-03-21 | End: 2019-09-06

## 2017-05-10 ENCOUNTER — OFFICE VISIT (OUTPATIENT)
Dept: SURGERY | Age: 32
End: 2017-05-10

## 2017-05-10 VITALS
WEIGHT: 293 LBS | HEIGHT: 69 IN | DIASTOLIC BLOOD PRESSURE: 75 MMHG | SYSTOLIC BLOOD PRESSURE: 118 MMHG | BODY MASS INDEX: 43.4 KG/M2 | OXYGEN SATURATION: 99 % | HEART RATE: 82 BPM | RESPIRATION RATE: 16 BRPM

## 2017-05-10 DIAGNOSIS — K90.9 INTESTINAL MALABSORPTION, UNSPECIFIED TYPE: Primary | ICD-10-CM

## 2017-05-10 DIAGNOSIS — Z98.84 S/P BARIATRIC SURGERY: ICD-10-CM

## 2017-05-10 RX ORDER — BISMUTH SUBSALICYLATE 262 MG
1 TABLET,CHEWABLE ORAL DAILY
Status: ON HOLD | COMMUNITY
End: 2020-01-08

## 2017-05-10 NOTE — MR AVS SNAPSHOT
Visit Information Date & Time Provider Department Dept. Phone Encounter #  
 5/10/2017 11:30 AM Jessica Glasgow MD [facility] [phone] [id number] Your Appointments 7/19/2017 11:00 AM  
EST PATIENT PROBLEM with Jessica Glasgow MD  
[address] [facility]) Appt Note: 6 mo bypass [address] [place] [address] [address] [place] [address]  
  
   
 [address] [place] [address] Upcoming Health Maintenance Date Due DTaP/Tdap/Td series (1 - Tdap) 10/12/2008 PAP AKA CERVICAL CYTOLOGY 10/12/2008 INFLUENZA AGE 9 TO ADULT 8/1/2017 Allergies as of 5/10/2017  Review Complete On: 5/10/2017 By: Sarmad Harrison No Known Allergies Current Immunizations  Reviewed on 1/27/2017 No immunizations on file. Not reviewed this visit You Were Diagnosed With   
  
 Codes Comments Intestinal malabsorption, unspecified type    -  Primary ICD-10-CM: K90.9 ICD-9-CM: 579.9 S/P bariatric surgery     ICD-10-CM: Z98.84 ICD-9-CM: V45.86 Vitals BP Pulse Resp Height(growth percentile) Weight(growth percentile) SpO2  
 118/75 (BP 1 Location: Left arm, BP Patient Position: Sitting) 82 16 5' 9\" (1.753 m) 293 lb (132.9 kg) 99% BMI OB Status Smoking Status 43.27 kg/m2 Having regular periods Never Smoker Vitals History BMI and BSA Data Body Mass Index Body Surface Area  
 43.27 kg/m 2 2.54 m 2 Preferred Pharmacy Pharmacy Name Phone [id number] - [address] [facility] [place], [place] [phone] Your Updated Medication List  
  
   
This list is accurate as of: 5/10/17 12:36 PM.  Always use your most recent med list.  
  
  
  
  
 Biotin 2,500 mcg Cap Take  by mouth. CALCIUM CITRATE + PO Take  by mouth. CeleXA 40 mg tablet Generic drug:  citalopram  
 Take 40 mg by mouth every evening. cyanocobalamin 1,000 mcg/mL injection Commonly known as:  VITAMIN B12  
1 mL by SubCUTAneous route every thirty (30) days. multivitamin tablet Commonly known as:  ONE A DAY Take 1 Tab by mouth daily. multivitamin with iron chewable tablet Commonly known as:  Ilsa Patient Take 1 Tab by mouth two (2) times a day. nystatin topical cream  
Commonly known as:  MYCOSTATIN Apply  to affected area two (2) times a day. PROBIOTIC 4X 10-15 mg Tbec Generic drug:  B.infantis-B.ani-B.long-B.bifi Take  by mouth. raNITIdine 300 mg tablet Commonly known as:  ZANTAC Take 1 Tab by mouth daily. SYNTHROID PO Take  by mouth. Syringe with Needle (Disp) 3 mL 25 x 5/8\" Syrg 1 mL by SubCUTAneous route every thirty (30) days. Patient Instructions Body Mass Index: Care Instructions Your Care Instructions Body mass index (BMI) can help you see if your weight is raising your risk for health problems. It uses a formula to compare how much you weigh with how tall you are. · A BMI lower than 18.5 is considered underweight. · A BMI between 18.5 and 24.9 is considered healthy. · A BMI between 25 and 29.9 is considered overweight. A BMI of 30 or higher is considered obese. If your BMI is in the normal range, it means that you have a lower risk for weight-related health problems. If your BMI is in the overweight or obese range, you may be at increased risk for weight-related health problems, such as high blood pressure, heart disease, stroke, arthritis or joint pain, and diabetes. If your BMI is in the underweight range, you may be at increased risk for health problems such as fatigue, lower protection (immunity) against illness, muscle loss, bone loss, hair loss, and hormone problems. BMI is just one measure of your risk for weight-related health problems. You may be at higher risk for health problems if you are not active, you eat an unhealthy diet, or you drink too much alcohol or use tobacco products. Follow-up care is a key part of your treatment and safety. Be sure to make and go to all appointments, and call your doctor if you are having problems. It's also a good idea to know your test results and keep a list of the medicines you take. How can you care for yourself at home? · Practice healthy eating habits. This includes eating plenty of fruits, vegetables, whole grains, lean protein, and low-fat dairy. · If your doctor recommends it, get more exercise. Walking is a good choice. Bit by bit, increase the amount you walk every day. Try for at least 30 minutes on most days of the week. · Do not smoke. Smoking can increase your risk for health problems. If you need help quitting, talk to your doctor about stop-smoking programs and medicines. These can increase your chances of quitting for good. · Limit alcohol to 2 drinks a day for men and 1 drink a day for women. Too much alcohol can cause health problems. If you have a BMI higher than 25 · Your doctor may do other tests to check your risk for weight-related health problems. This may include measuring the distance around your waist. A waist measurement of more than 40 inches in men or 35 inches in women can increase the risk of weight-related health problems. · Talk with your doctor about steps you can take to stay healthy or improve your health. You may need to make lifestyle changes to lose weight and stay healthy, such as changing your diet and getting regular exercise. If you have a BMI lower than 18.5 · Your doctor may do other tests to check your risk for health problems. · Talk with your doctor about steps you can take to stay healthy or improve your health.  You may need to make lifestyle changes to gain or maintain weight and stay healthy, such as getting more healthy foods in your diet and doing exercises to build muscle. Where can you learn more? Go to http://cristina-melodie.info/. Enter S176 in the search box to learn more about \"Body Mass Index: Care Instructions. \" Current as of: January 23, 2017 Content Version: 11.2 © 2650-3925 [employer]. Care instructions adapted under license by [employer] (which disclaims liability or warranty for this information). If you have questions about a medical condition or this instruction, always ask your healthcare professional. [address] any warranty or liability for your use of this information. Introducing [facility]! Vamsi Wilhelm introduces [employer] LS15 patient portal. Now you can access parts of your medical record, email your doctor's office, and request medication refills online. 1. In your internet browser, go to [email or web address] 2. Click on the First Time User? Click Here link in the Sign In box. You will see the New Member Sign Up page. 3. Enter your Newco LS15 Access Code exactly as it appears below. You will not need to use this code after youve completed the sign-up process. If you do not sign up before the expiration date, you must request a new code. · [employer]co LS15 Access Code: [email or web address] Expires: 8/8/2017 12:36 PM 
 
4. Enter the last four digits of your Social Security Number (xxxx) and Date of Birth (mm/dd/yyyy) as indicated and click Submit. You will be taken to the next sign-up page. 5. Create a Newco LS15 ID. This will be your [employer] LS15 login ID and cannot be changed, so think of one that is secure and easy to remember. 6. Create a [employer]co LS15 password. You can change your password at any time. 7. Enter your Password Reset Question and Answer. This can be used at a later time if you forget your password. 8. Enter your e-mail address. You will receive e-mail notification when new information is available in [address] 9. Click Sign Up. You can now view and download portions of your medical record. 10. Click the Download Summary menu link to download a portable copy of your medical information. If you have questions, please visit the Frequently Asked Questions section of the [employer] website. Remember, [employer] is NOT to be used for urgent needs. For medical emergencies, dial 911. Now available from your iPhone and Android! Please provide this summary of care documentation to your next provider. Your primary care clinician is listed as Phys Other. If you have any questions after today's visit, please call [phone].

## 2017-05-10 NOTE — PROGRESS NOTES
Subjective:     Alison Burrows  is a 36 y.o. female who presents for follow-up about 3.5 months following laparoscopic gastric bypass surgery. She has   lost a total of 80 pounds since surgery. Body mass index is 43.27 kg/(m^2). . EBWL is (35%). The patient presents today to assess   their progress toward their goal of weight loss and to address any issues that may be present. Today the patient and I have reviewed   their diet and how appropriate their food choices are. The following issues have been identified - none from a surgical standpoint. Pain assessment - 0/10  . Surgery related complication: NA       She reports no real issues and denies vomiting, abdominal pain, diarrhea and difficulty breathing. The patient's exercise level: moderately active. Changes in her medical history and medications have been reviewed. Patient Active Problem List   Diagnosis Code    Morbid obesity with BMI of 50.0-59.9, adult ([facility]) E66.01, Z68.43    Morbid obesity ([facility] [address] E66.01    Hypothyroidism E03.9    Anxiety F41.9    EVI (stress urinary incontinence, female) N39.3    Snores R06.83    Irregular menses N92.6    Arthritic-like pain M25.50    Hypertension I10    Morbid obesity with body mass index of 50.0-59.9 in adult ([facility]) E66.01, Z68.43    S/P bariatric surgery Z98.84    Intestinal malabsorption K90.9     Past Medical History:   Diagnosis Date    Anxiety     Arthritic-like pain     knees    Hypertension     during preg    Hypothyroidism     Irregular menses     Morbid obesity ([facility] [address]     Morbid obesity with BMI of 50.0-59.9, adult ([facility])     Snores     EVI (stress urinary incontinence, female)      Past Surgical History:   Procedure Laterality Date    DILATION AND CURETTAGE      X 2 both for miscarriage    HX LAP CHOLECYSTECTOMY       Current Outpatient Prescriptions   Medication Sig Dispense Refill    multivitamin (ONE A DAY) tablet Take 1 Tab by mouth daily.       nystatin (MYCOSTATIN) topical cream Apply  to affected area two (2) times a day. 30 g 3    CALCIUM CITRATE/VITAMIN D3 (CALCIUM CITRATE + PO) Take  by mouth.  Biotin 2,500 mcg cap Take  by mouth.  B.infantis-B.ani-B.long-B.bifi (PROBIOTIC 4X) 10-15 mg TbEC Take  by mouth.  cyanocobalamin (VITAMIN B12) 1,000 mcg/mL injection 1 mL by SubCUTAneous route every thirty (30) days. 3 mL 4    Syringe with Needle, Disp, 3 mL 25 x 5/8\" syrg 1 mL by SubCUTAneous route every thirty (30) days. 15 Syringe 0    citalopram (CELEXA) 40 mg tablet Take 40 mg by mouth every evening.  multivitamin with iron (FLINTSTONES) chewable tablet Take 1 Tab by mouth two (2) times a day.  raNITIdine (ZANTAC) 300 mg tablet Take 1 Tab by mouth daily. 30 Tab 3    LEVOTHYROXINE SODIUM (SYNTHROID PO) Take  by mouth. Review of Symptoms:     General - No history or complaints of unexpected fever or chills  Head/Neck - No history or complaints of headache or dizziness  Cardiac - No history or complaints of chest pain, palpitations, or shortness of breath  Pulmonary - No history or complaints of shortness of breath or productive cough  Gastrointestinal - as noted above  Genitourinary - No history or complaints of hematuria/dysuria or renal lithiasis  Musculoskeletal - No history or complaints of joint  muscular weakness  Hematologic - No history of any bleeding episodes  Neurologic - No history or complaints of  migraine headaches or neurologic symptoms    Objective:     Visit Vitals    /75 (BP 1 Location: Left arm, BP Patient Position: Sitting)    Pulse 82    Ht 5' 9\" (1.753 m)    Wt 132.9 kg (293 lb)    SpO2 99%    BMI 43.27 kg/m2        Physical Exam:    General:  alert, cooperative, no distress, appears stated age   Lungs:   clear to auscultation bilaterally   Heart:  Regular rate and rhythm   Abdomen:   abdomen is soft without significant tenderness, masses, organomegaly or guarding;     Incisions: healing well, no significant drainage         Assessment:     1. History of Morbid obesity, status post  laparoscopic gastric bypass surgery. Doing well; no concerns. Plan:     1. Remember to measure portions, continue low carbohydrate diet  2. Continue to expand solid foods. 3. Remember vitamin supplements - The importance of such was discussed regarding the malabsorptive issues that the surgery creates  4. Exercise regimen appears adequate. 5. Attend support group  6. Follow-up in 2 month(s). 7. Total time spent with the patient 30 minutes. 8. The patient understands the plan of action       I have reviewed the patients history and clinical findings with my physician extender in person. The patient has had all of their questions answered today including both exercise and dietary issues. I have reviwed any relevant  data and agree with the current plan of action.           Cyndie Bejarano M.D.

## 2017-05-10 NOTE — PATIENT INSTRUCTIONS
Body Mass Index: Care Instructions  Your Care Instructions    Body mass index (BMI) can help you see if your weight is raising your risk for health problems. It uses a formula to compare how much you weigh with how tall you are. · A BMI lower than 18.5 is considered underweight. · A BMI between 18.5 and 24.9 is considered healthy. · A BMI between 25 and 29.9 is considered overweight. A BMI of 30 or higher is considered obese. If your BMI is in the normal range, it means that you have a lower risk for weight-related health problems. If your BMI is in the overweight or obese range, you may be at increased risk for weight-related health problems, such as high blood pressure, heart disease, stroke, arthritis or joint pain, and diabetes. If your BMI is in the underweight range, you may be at increased risk for health problems such as fatigue, lower protection (immunity) against illness, muscle loss, bone loss, hair loss, and hormone problems. BMI is just one measure of your risk for weight-related health problems. You may be at higher risk for health problems if you are not active, you eat an unhealthy diet, or you drink too much alcohol or use tobacco products. Follow-up care is a key part of your treatment and safety. Be sure to make and go to all appointments, and call your doctor if you are having problems. It's also a good idea to know your test results and keep a list of the medicines you take. How can you care for yourself at home? · Practice healthy eating habits. This includes eating plenty of fruits, vegetables, whole grains, lean protein, and low-fat dairy. · If your doctor recommends it, get more exercise. Walking is a good choice. Bit by bit, increase the amount you walk every day. Try for at least 30 minutes on most days of the week. · Do not smoke. Smoking can increase your risk for health problems. If you need help quitting, talk to your doctor about stop-smoking programs and medicines. These can increase your chances of quitting for good. · Limit alcohol to 2 drinks a day for men and 1 drink a day for women. Too much alcohol can cause health problems. If you have a BMI higher than 25  · Your doctor may do other tests to check your risk for weight-related health problems. This may include measuring the distance around your waist. A waist measurement of more than 40 inches in men or 35 inches in women can increase the risk of weight-related health problems. · Talk with your doctor about steps you can take to stay healthy or improve your health. You may need to make lifestyle changes to lose weight and stay healthy, such as changing your diet and getting regular exercise. If you have a BMI lower than 18.5  · Your doctor may do other tests to check your risk for health problems. · Talk with your doctor about steps you can take to stay healthy or improve your health. You may need to make lifestyle changes to gain or maintain weight and stay healthy, such as getting more healthy foods in your diet and doing exercises to build muscle. Where can you learn more? Go to http://cristina-melodie.info/. Enter S176 in the search box to learn more about \"Body Mass Index: Care Instructions. \"  Current as of: January 23, 2017  Content Version: 11.2  © 4080-1531 [employer]. Care instructions adapted under license by [employer] (which disclaims liability or warranty for this information). If you have questions about a medical condition or this instruction, always ask your healthcare professional. [place] [address] any warranty or liability for your use of this information.

## 2017-07-19 ENCOUNTER — OFFICE VISIT (OUTPATIENT)
Dept: SURGERY | Age: 32
End: 2017-07-19

## 2017-07-19 ENCOUNTER — HOSPITAL ENCOUNTER (OUTPATIENT)
Dept: LAB | Age: 32
Discharge: HOME | End: 2017-07-19

## 2017-07-19 VITALS
WEIGHT: 263 LBS | SYSTOLIC BLOOD PRESSURE: 114 MMHG | OXYGEN SATURATION: 100 % | RESPIRATION RATE: 16 BRPM | DIASTOLIC BLOOD PRESSURE: 71 MMHG | HEIGHT: 69 IN | BODY MASS INDEX: 38.95 KG/M2 | HEART RATE: 65 BPM

## 2017-07-19 DIAGNOSIS — K90.9 INTESTINAL MALABSORPTION, UNSPECIFIED TYPE: Primary | ICD-10-CM

## 2017-07-19 DIAGNOSIS — K90.9 INTESTINAL MALABSORPTION, UNSPECIFIED TYPE: ICD-10-CM

## 2017-07-19 DIAGNOSIS — Z98.84 STATUS POST GASTRIC BYPASS FOR OBESITY: ICD-10-CM

## 2017-07-19 PROCEDURE — 99001 SPECIMEN HANDLING PT-LAB: CPT | Performed by: SPECIALIST

## 2017-07-19 NOTE — PROGRESS NOTES
Subjective:     Margarita Tobar  is a 36 y.o. female who presents for follow-up about 6 months following laparoscopic gastric bypass surgery. She has lost a total of 110 pounds since surgery. Body mass index is 38.84 kg/(m^2). . EBWL is (48%). The patient presents today to assess their progress toward their goal of weight loss and to address any issues that may be present. Today the patient and I have reviewed their diet and how appropriate their food choices are. The following issues have been identified - none from a surgical standpoint. .  Surgery related complication: NA       She reports no real issues and denies vomiting, abdominal pain, diarrhea and difficulty breathing. Patients pain score:0/10      The patient's exercise level: moderately active. Changes in her medical history and medications have been reviewed. Patient Active Problem List   Diagnosis Code    Morbid obesity with BMI of 50.0-59.9, adult ([facility]) E66.01, Z68.43    Morbid obesity ([facility] [address] E66.01    Hypothyroidism E03.9    Anxiety F41.9    EVI (stress urinary incontinence, female) N39.3    Snores R06.83    Irregular menses N92.6    Arthritic-like pain M25.50    Hypertension I10    Morbid obesity with body mass index of 50.0-59.9 in adult ([facility]) E66.01, Z68.43    S/P bariatric surgery Z98.84    Intestinal malabsorption K90.9     Past Medical History:   Diagnosis Date    Anxiety     Arthritic-like pain     knees    Hypertension     during preg    Hypothyroidism     Irregular menses     Morbid obesity ([facility] [address]     Morbid obesity with BMI of 50.0-59.9, adult ([facility])     Snores     EVI (stress urinary incontinence, female)      Past Surgical History:   Procedure Laterality Date    DILATION AND CURETTAGE      X 2 both for miscarriage    HX LAP CHOLECYSTECTOMY       Current Outpatient Prescriptions   Medication Sig Dispense Refill    nystatin (MYCOSTATIN) topical cream Apply  to affected area two (2) times a day.  30 g 3    CALCIUM CITRATE/VITAMIN D3 (CALCIUM CITRATE + PO) Take  by mouth.  multivitamin with iron (FLINTSTONES) chewable tablet Take 1 Tab by mouth two (2) times a day.  Biotin 2,500 mcg cap Take  by mouth.  B.infantis-B.ani-B.long-B.bifi (PROBIOTIC 4X) 10-15 mg TbEC Take  by mouth.  cyanocobalamin (VITAMIN B12) 1,000 mcg/mL injection 1 mL by SubCUTAneous route every thirty (30) days. 3 mL 4    Syringe with Needle, Disp, 3 mL 25 x 5/8\" syrg 1 mL by SubCUTAneous route every thirty (30) days. 15 Syringe 0    citalopram (CELEXA) 40 mg tablet Take 40 mg by mouth every evening.  LEVOTHYROXINE SODIUM (SYNTHROID PO) Take  by mouth.  multivitamin (ONE A DAY) tablet Take 1 Tab by mouth daily.  raNITIdine (ZANTAC) 300 mg tablet Take 1 Tab by mouth daily. 30 Tab 3        Review of Symptoms:       General - No history or complaints of unexpected fever or chills  Head/Neck - No history or complaints of headache or dizziness  Cardiac - No history or complaints of chest pain, palpitations, or shortness of breath  Pulmonary - No history or complaints of shortness of breath or productive cough  Gastrointestinal - as noted above  Genitourinary - No history or complaints of hematuria/dysuria or renal lithiasis  Musculoskeletal - No history or complaints of joint  muscular weakness  Hematologic - No history of any bleeding episodes  Neurologic - No history or complaints of  migraine headaches or neurologic symptoms                     Objective:     Visit Vitals    /71 (BP 1 Location: Left arm, BP Patient Position: Sitting)    Pulse 65    Resp 16    Ht 5' 9\" (1.753 m)    Wt 119.3 kg (263 lb)    SpO2 100%    BMI 38.84 kg/m2        Physical Exam:    General:  alert, cooperative, no distress, appears stated age   Lungs:   clear to auscultation bilaterally   Heart:  Regular rate and rhythm   Abdomen:   abdomen is soft without significant tenderness, masses, organomegaly or guarding;     Incisions: healing well, no significant drainage         Lab Results   Component Value Date/Time    WBC 11.9 01/26/2017 09:25 PM    HGB 11.8 01/26/2017 09:25 PM    HCT 35.5 01/26/2017 09:25 PM    PLATELET 335 42/62/0697 09:25 PM    MCV 84.9 01/26/2017 09:25 PM     Lab Results   Component Value Date/Time    Sodium 140 01/23/2017 12:17 PM    Potassium 3.9 01/23/2017 12:17 PM    Chloride 103 01/23/2017 12:17 PM    CO2 28 01/23/2017 12:17 PM    Anion gap 9 01/23/2017 12:17 PM    Glucose 84 01/23/2017 12:17 PM    BUN 11 01/23/2017 12:17 PM    Creatinine 0.75 01/23/2017 12:17 PM    BUN/Creatinine ratio 15 01/23/2017 12:17 PM    GFR est AA >60 01/23/2017 12:17 PM    GFR est non-AA >60 01/23/2017 12:17 PM    Calcium 8.8 01/23/2017 12:17 PM    Bilirubin, total 0.3 01/23/2017 12:17 PM    AST (SGOT) 16 01/23/2017 12:17 PM    Alk. phosphatase 45 01/23/2017 12:17 PM    Protein, total 7.6 01/23/2017 12:17 PM    Albumin 3.7 01/23/2017 12:17 PM    Globulin 3.9 01/23/2017 12:17 PM    A-G Ratio 0.9 01/23/2017 12:17 PM    ALT (SGPT) 24 01/23/2017 12:17 PM     No results found for: IRON, FE, TIBC, IBCT, PSAT, FERR  No results found for: FOL, RBCF  No results found for: VITD3, Rebekah Brenda, XQVID, VD3RIA          Assessment:     1. History of Morbid obesity, status post  laparoscopic gastric bypass surgery. Doing well, no concerns. Long discussion with pt regarding next 6 month wt loss plan and realistic expectations. Recent PCP visit for UTI - treated successfully without issue. Desires to get her weight under 200 lbs. Plan:     1. Remember to measure portions, continue low carbohydrate diet  2. Continue to concentrate on protein intake meeting daily requirements  3. Remember vitamin supplements. The importance of such was discussed regarding the malabsorptive issues that the surgery creates. 4. Exercise regimen appears to be: adequate  5. Try and attend support group if feasible. 6. Follow-up in 3 month(s).   7. Lab reviewed and appropriate changes made. 8. Total time spent with the patient 30 minutes.

## 2017-07-19 NOTE — PATIENT INSTRUCTIONS
Body Mass Index: Care Instructions  Your Care Instructions    Body mass index (BMI) can help you see if your weight is raising your risk for health problems. It uses a formula to compare how much you weigh with how tall you are. · A BMI lower than 18.5 is considered underweight. · A BMI between 18.5 and 24.9 is considered healthy. · A BMI between 25 and 29.9 is considered overweight. A BMI of 30 or higher is considered obese. If your BMI is in the normal range, it means that you have a lower risk for weight-related health problems. If your BMI is in the overweight or obese range, you may be at increased risk for weight-related health problems, such as high blood pressure, heart disease, stroke, arthritis or joint pain, and diabetes. If your BMI is in the underweight range, you may be at increased risk for health problems such as fatigue, lower protection (immunity) against illness, muscle loss, bone loss, hair loss, and hormone problems. BMI is just one measure of your risk for weight-related health problems. You may be at higher risk for health problems if you are not active, you eat an unhealthy diet, or you drink too much alcohol or use tobacco products. Follow-up care is a key part of your treatment and safety. Be sure to make and go to all appointments, and call your doctor if you are having problems. It's also a good idea to know your test results and keep a list of the medicines you take. How can you care for yourself at home? · Practice healthy eating habits. This includes eating plenty of fruits, vegetables, whole grains, lean protein, and low-fat dairy. · If your doctor recommends it, get more exercise. Walking is a good choice. Bit by bit, increase the amount you walk every day. Try for at least 30 minutes on most days of the week. · Do not smoke. Smoking can increase your risk for health problems. If you need help quitting, talk to your doctor about stop-smoking programs and medicines. These can increase your chances of quitting for good. · Limit alcohol to 2 drinks a day for men and 1 drink a day for women. Too much alcohol can cause health problems. If you have a BMI higher than 25  · Your doctor may do other tests to check your risk for weight-related health problems. This may include measuring the distance around your waist. A waist measurement of more than 40 inches in men or 35 inches in women can increase the risk of weight-related health problems. · Talk with your doctor about steps you can take to stay healthy or improve your health. You may need to make lifestyle changes to lose weight and stay healthy, such as changing your diet and getting regular exercise. If you have a BMI lower than 18.5  · Your doctor may do other tests to check your risk for health problems. · Talk with your doctor about steps you can take to stay healthy or improve your health. You may need to make lifestyle changes to gain or maintain weight and stay healthy, such as getting more healthy foods in your diet and doing exercises to build muscle. Where can you learn more? Go to http://cristina-melodie.info/. Enter S176 in the search box to learn more about \"Body Mass Index: Care Instructions. \"  Current as of: January 23, 2017  Content Version: 11.3  © 8433-4728 [employer]. Care instructions adapted under license by [employer] (which disclaims liability or warranty for this information). If you have questions about a medical condition or this instruction, always ask your healthcare professional. [place] [address] any warranty or liability for your use of this information. Patient Instructions      1. Continue to monitor carbohydrate and protein intake- remember to keep your           total  carbohydrates to 50 grams or less per day for best results.   2. Remember hydration goals - usually 48 to 64 ounces of liquids per day  3. Continue to work towards exercise goals - minimum 3 days per week of 45          minutes to  1 hour at a time. 4. Remember to take vitamins as directed        Supplement Resource Guide    Importance of Protein:   Maintains lean body mass, produces antibodies to fight off infections, heals wounds, minimizes hair loss, helps to give you energy, helps with satiety, and keeping you full between meals. Importance of Calcium:  Needed for healthy bones and teeth, normal blood clotting, and nervous system functioning, higher risk of osteoporosis and bone disease with non-compliance. Importance of Multivitamins: Many functions. Supply you with extra nutrients that you may be missing from food. May lead to iron deficiency anemia, weakness, fatigue, and many other symptoms with non-compliance. Importance of B Vitamins:  Important for red blood cell formation, metabolism, energy, and helps to maintain a healthy nervous system. Protein Supplement  Find one you like now. Use immediately after surgery. Look for:  35-50g protein each day from your protein supplement once you reach the progression diet. 0-3 g fat per serving  0-3 g sugar per serving    Protein drinks should be split in separate dosages. Recommend: Lifelong  1 year + Calcium Supplement:     Start taking within a month after surgery. Look for: Calcium Citrate Plus D (1500 mg per day)  Recommend: Citracal     .          Avoid chocolate chewable calcium. Can use chewable bariatric or [employer] brand or similar chewable. The body cannot absorb more than 500-600 mg @ a time. Take for Life Multi-vitamin Supplement:      1st Month After Surgery: Any complete chewable, such as: [place]s Complete chewables. Avoid [place] sours or gummies. They lack iron and other important nutrients and also have added sugar.     Continue with chewable vitamin or change to adult complete multivitamin one month after surgery. Menstruating women can take a prenatal vitamin. Make sure has at least 18 mg iron and 299-381 mcg folic acid):    Vitamin B12, B Complex Vitamin, and Biotin  Start taking within a month after surgery. Vitamin B12:  1000 mcg of Vitamin B12 three times weekly    Must take sublingually (meaning you take it under your tongue) or in a liquid drop form for easy absorption. B Complex Vitamin: Take a pill or liquid drop form once daily. Biotin: This vitamin can help prevent hair loss.     Recommend 5mg   (5000 mcg) a day  Biotin is Optional

## 2017-07-21 LAB
25(OH)D3+25(OH)D2 SERPL-MCNC: 35.4 NG/ML (ref 30–100)
ALBUMIN SERPL-MCNC: 4 G/DL (ref 3.5–5.5)
ALBUMIN/GLOB SERPL: 1.7 {RATIO} (ref 1.2–2.2)
ALP SERPL-CCNC: 53 IU/L (ref 39–117)
ALT SERPL-CCNC: 16 IU/L (ref 0–32)
AST SERPL-CCNC: 19 IU/L (ref 0–40)
BASOPHILS # BLD AUTO: 0 X10E3/UL (ref 0–0.2)
BASOPHILS NFR BLD AUTO: 0 %
BILIRUB SERPL-MCNC: 0.4 MG/DL (ref 0–1.2)
BUN SERPL-MCNC: 11 MG/DL (ref 6–20)
BUN/CREAT SERPL: 19 (ref 9–23)
CALCIUM SERPL-MCNC: 9.1 MG/DL (ref 8.7–10.2)
CHLORIDE SERPL-SCNC: 99 MMOL/L (ref 96–106)
CO2 SERPL-SCNC: 25 MMOL/L (ref 18–29)
CREAT SERPL-MCNC: 0.58 MG/DL (ref 0.57–1)
EOSINOPHIL # BLD AUTO: 0.1 X10E3/UL (ref 0–0.4)
EOSINOPHIL NFR BLD AUTO: 2 %
ERYTHROCYTE [DISTWIDTH] IN BLOOD BY AUTOMATED COUNT: 14 % (ref 12.3–15.4)
FERRITIN SERPL-MCNC: 32 NG/ML (ref 15–150)
FOLATE SERPL-MCNC: 2.8 NG/ML
GLOBULIN SER CALC-MCNC: 2.4 G/DL (ref 1.5–4.5)
GLUCOSE SERPL-MCNC: 76 MG/DL (ref 65–99)
HCT VFR BLD AUTO: 39.5 % (ref 34–46.6)
HGB BLD-MCNC: 13 G/DL (ref 11.1–15.9)
IMM GRANULOCYTES # BLD: 0 X10E3/UL (ref 0–0.1)
IMM GRANULOCYTES NFR BLD: 0 %
IRON SERPL-MCNC: 72 UG/DL (ref 27–159)
LYMPHOCYTES # BLD AUTO: 2.7 X10E3/UL (ref 0.7–3.1)
LYMPHOCYTES NFR BLD AUTO: 34 %
MCH RBC QN AUTO: 29.1 PG (ref 26.6–33)
MCHC RBC AUTO-ENTMCNC: 32.9 G/DL (ref 31.5–35.7)
MCV RBC AUTO: 88 FL (ref 79–97)
MONOCYTES # BLD AUTO: 0.7 X10E3/UL (ref 0.1–0.9)
MONOCYTES NFR BLD AUTO: 9 %
NEUTROPHILS # BLD AUTO: 4.3 X10E3/UL (ref 1.4–7)
NEUTROPHILS NFR BLD AUTO: 55 %
PLATELET # BLD AUTO: 264 X10E3/UL (ref 150–379)
POTASSIUM SERPL-SCNC: 3.8 MMOL/L (ref 3.5–5.2)
PROT SERPL-MCNC: 6.4 G/DL (ref 6–8.5)
RBC # BLD AUTO: 4.47 X10E6/UL (ref 3.77–5.28)
SODIUM SERPL-SCNC: 140 MMOL/L (ref 134–144)
VIT B1 BLD-SCNC: 95.7 NMOL/L (ref 66.5–200)
VIT B12 SERPL-MCNC: 596 PG/ML (ref 211–946)
WBC # BLD AUTO: 7.9 X10E3/UL (ref 3.4–10.8)

## 2017-08-05 NOTE — PROGRESS NOTES
Call patient and tell her her Folate level is critically low. Start her on Folate 1 gram tablet daily in addition to her current MVI and B vitamins.   Give her lab slip to repeat Folate level in 2 months

## 2017-08-08 ENCOUNTER — TELEPHONE (OUTPATIENT)
Dept: SURGERY | Age: 32
End: 2017-08-08

## 2017-08-08 DIAGNOSIS — R89.9 ABNORMAL LABORATORY TEST RESULT: Primary | ICD-10-CM

## 2017-08-08 RX ORDER — FOLIC ACID 1 MG/1
1 TABLET ORAL DAILY
Qty: 30 TAB | Refills: 1 | Status: SHIPPED | OUTPATIENT
Start: 2017-08-08 | End: 2017-12-09 | Stop reason: SDUPTHER

## 2017-08-08 NOTE — TELEPHONE ENCOUNTER
----- Message from Emily Diaz MD sent at 8/5/2017  3:02 PM EDT -----  Call patient and tell her her Folate level is critically low. Start her on Folate 1 gram tablet daily in addition to her current MVI and B vitamins.   Give her lab slip to repeat Folate level in 2 months

## 2017-08-08 NOTE — TELEPHONE ENCOUNTER
PATIENT ADVISED AND PHARMACY VERIFIED.   MAILED LAB SLIP TO PATIENT TO REPEAT FOLATE LEVEL IN 2 MTHS

## 2017-09-17 DIAGNOSIS — K90.9 INTESTINAL MALABSORPTION, UNSPECIFIED TYPE: ICD-10-CM

## 2017-12-09 RX ORDER — FOLIC ACID 1 MG/1
TABLET ORAL
Qty: 30 TAB | Refills: 1 | Status: SHIPPED | OUTPATIENT
Start: 2017-12-09 | End: 2018-03-22 | Stop reason: SDUPTHER

## 2018-03-20 DIAGNOSIS — Z98.84 S/P BARIATRIC SURGERY: ICD-10-CM

## 2018-03-20 DIAGNOSIS — K90.89 OTHER SPECIFIED INTESTINAL MALABSORPTION: Primary | ICD-10-CM

## 2018-03-22 ENCOUNTER — HOSPITAL ENCOUNTER (OUTPATIENT)
Dept: LAB | Age: 33
Discharge: HOME | End: 2018-03-22

## 2018-03-22 ENCOUNTER — OFFICE VISIT (OUTPATIENT)
Dept: SURGERY | Age: 33
End: 2018-03-22

## 2018-03-22 VITALS
OXYGEN SATURATION: 99 % | DIASTOLIC BLOOD PRESSURE: 62 MMHG | HEIGHT: 69 IN | BODY MASS INDEX: 29.99 KG/M2 | WEIGHT: 202.5 LBS | HEART RATE: 66 BPM | SYSTOLIC BLOOD PRESSURE: 113 MMHG

## 2018-03-22 DIAGNOSIS — E53.8 FOLATE DEFICIENCY: ICD-10-CM

## 2018-03-22 DIAGNOSIS — E53.8 B12 DEFICIENCY: ICD-10-CM

## 2018-03-22 DIAGNOSIS — Z98.84 S/P BARIATRIC SURGERY: ICD-10-CM

## 2018-03-22 DIAGNOSIS — K90.89 OTHER SPECIFIED INTESTINAL MALABSORPTION: Primary | ICD-10-CM

## 2018-03-22 PROBLEM — E66.01 MORBID OBESITY WITH BODY MASS INDEX OF 50.0-59.9 IN ADULT (HCC): Status: RESOLVED | Noted: 2017-01-26 | Resolved: 2018-03-22

## 2018-03-22 PROCEDURE — 99001 SPECIMEN HANDLING PT-LAB: CPT | Performed by: SPECIALIST

## 2018-03-22 RX ORDER — FOLIC ACID 1 MG/1
1 TABLET ORAL DAILY
Qty: 30 TAB | Refills: 5 | Status: SHIPPED | OUTPATIENT
Start: 2018-03-22 | End: 2019-09-06

## 2018-03-22 RX ORDER — CYANOCOBALAMIN 1000 UG/ML
1000 INJECTION, SOLUTION INTRAMUSCULAR; SUBCUTANEOUS
Qty: 3 ML | Refills: 4
Start: 2018-03-22 | End: 2018-07-16 | Stop reason: SDUPTHER

## 2018-03-22 NOTE — MR AVS SNAPSHOT
[place] [address] 
[phone] Patient: Kellie Ontiveros MRN: [id number] :1985 Visit Information Date & Time Provider Department Dept. Phone Encounter #  
 3/22/2018  1:30 PM [facility] [place] [id number] Follow-up Instructions Return in about 6 months (around 2018). Upcoming Health Maintenance Date Due DTaP/Tdap/Td series (1 - Tdap) 10/12/2008 PAP AKA CERVICAL CYTOLOGY 10/12/2008 Influenza Age 5 to Adult 2017 Allergies as of 3/22/2018  Review Complete On: 3/22/2018 By: Xochitl Landa NP No Known Allergies Current Immunizations  Reviewed on 2017 No immunizations on file. Not reviewed this visit You Were Diagnosed With   
  
 Codes Comments Other specified intestinal malabsorption    -  Primary ICD-10-CM: K90.89 ICD-9-CM: 579.8 S/P bariatric surgery     ICD-10-CM: Z98.84 ICD-9-CM: V45.86   
 B12 deficiency     ICD-10-CM: E53.8 ICD-9-CM: 266.2 Folate deficiency     ICD-10-CM: E53.8 ICD-9-CM: 266.2 Vitals BP Pulse Height(growth percentile) Weight(growth percentile) SpO2 BMI  
 113/62 (BP 1 Location: Left arm, BP Patient Position: Sitting) 66 5' 9\" (1.753 m) 202 lb 8 oz (91.9 kg) 99% 29.9 kg/m2 OB Status Smoking Status Having regular periods Never Smoker BMI and BSA Data Body Mass Index Body Surface Area  
 29.9 kg/m 2 2.12 m 2 Preferred Pharmacy Pharmacy Name Phone [id number] [place], [address] [facility] Your Updated Medication List  
  
   
This list is accurate as of 3/22/18  2:13 PM.  Always use your most recent med list.  
  
  
  
  
 Biotin 2,500 mcg Cap Take  by mouth. CALCIUM CITRATE + PO Take  by mouth. CeleXA 40 mg tablet Generic drug:  citalopram  
 Take 40 mg by mouth every evening. cyanocobalamin 1,000 mcg/mL injection Commonly known as:  VITAMIN B12  
1 mL by SubCUTAneous route every thirty (30) days. folic acid 1 mg tablet Commonly known as:  Google Take 1 Tab by mouth daily. multivitamin tablet Commonly known as:  ONE A DAY Take 1 Tab by mouth daily. nystatin topical cream  
Commonly known as:  MYCOSTATIN Apply  to affected area two (2) times a day. PROBIOTIC 4X 10-15 mg Tbec Generic drug:  B.infantis-B.ani-B.long-B.bifi Take  by mouth. Syringe with Needle (Disp) 3 mL 25 x 5/8\" Syrg 1 mL by SubCUTAneous route every thirty (30) days. Prescriptions Sent to Pharmacy Refills  
 folic acid (FOLVITE) 1 mg tablet 5 Sig: Take 1 Tab by mouth daily. Class: Normal  
 Pharmacy: [address] [place] [address] Ph #: [phone] Route: Oral  
 Syringe with Needle, Disp, 3 mL 25 x 5/8\" syrg 0 Si mL by SubCUTAneous route every thirty (30) days. Class: Normal  
 Pharmacy: [address] [place] [address] Ph #: [phone] Route: SubCUTAneous Follow-up Instructions Return in about 6 months (around 2018). Patient Instructions Patient Instructions 1. Remember hydration goals - minimum of 64 ounces of liquids per day (dehydration is the number one reason for hospital readmission). 2. Continue to monitor carbohydrate and protein intake you need a minimum of  Grams of protein daily- remember to keep your total carbohydrates to 50 grams or less per day for best results. 3. Continue to work towards exercise goals - 60-90 minutes, 5 times a week minimum of deliberate, aerobic exercise is the ultimate goal with strength training 2 times each week. Refer to Lollipuff for  information. 4. Remember to take vitamins as directed. 5. Attend support group the 2nd Thursday of each month. 6. Use Miralax if you become constipated. 7. Call us at [phone] or email us through [facility] with questions,     concerns or worsening of condition, we have someone on call 24 hours a day. If you are unable to reach our office, you are to go to your Primary Care Physician or the Emergency Department. Supplement Resource Guide Importance of Protein:  
Maintains lean body mass, produces antibodies to fight off infections, heals wounds, minimizes hair loss, helps to give you energy, helps with satiety, and keeping you full between meals. Importance of Calcium: 
Needed for healthy bones and teeth, normal blood clotting, and nervous system functioning, higher risk of osteoporosis and bone disease with non-compliance. Importance of Multivitamins: Many functions. Supply you with extra nutrients that you may be missing from food. May lead to iron deficiency anemia, weakness, fatigue, and many other symptoms with non-compliance. Importance of B Vitamins: 
Important for red blood cell formation, metabolism, energy, and helps to maintain a healthy nervous system. Protein Supplement Find one you like now. Use immediately after surgery. Look for: 
35-50g protein each day from your protein supplement once you reach the progression diet. 0-3 g fat per serving 0-3 g sugar per serving Protein drinks should be split in separate dosages. Recommend: Lifelong 1 year + Calcium Supplement:  
 
Start taking within a month after surgery. Look for: Calcium Citrate Plus D (1500 mg per day) Recommend: Citracal 
 
 . Avoid chocolate chewable calcium. Can use chewable bariatric or [employer] brand or similar chewable. The body cannot absorb more than 500-600 mg of calcium at a time. Take for Life Multi-vitamin Supplement: Start immediately after surgery: any complete chewable, such as: Alvords Complete chewables. Avoid Alvord sours or gummies. They lack iron and other important nutrients and also have added sugar. Continue with chewable vitamin or change to adult complete multivitamin one month after surgery. Menstruating women can take a prenatal vitamin. Make sure has at least 18 mg iron and 668-994 mcg folic acid Vitamin B12, B Complex Vitamin, and Biotin Start taking within a month after surgery. Vitamin B12:  1000 mcg of Vitamin B12 three times weekly Must take sublingually (meaning you take it under your tongue) or in a liquid drop form for easy absorption. B Complex Vitamin: Take a pill or liquid drop form once daily. Biotin: This vitamin can help prevent hair loss. Recommend 5mg  
(5000 mcg) a day Biotin is Optional  
 
 
 
  
Walking for Exercise: Care Instructions Your Care Instructions Walking is one of the easiest ways to get the exercise you need for good health. A brisk, 30-minute walk each day can help you feel better and have more energy. It can help you lower your risk of disease. Walking can help you keep your bones strong and your heart healthy. Check with your doctor before you start a walking plan if you have heart problems, other health issues, or you have not been active in a long time. Follow your doctor's instructions for safe levels of exercise. Follow-up care is a key part of your treatment and safety. Be sure to make and go to all appointments, and call your doctor if you are having problems. It's also a good idea to know your test results and keep a list of the medicines you take. How can you care for yourself at home? Getting started · Start slowly and set a short-term goal. For example, walk for 5 or 10 minutes every day. · Bit by bit, increase the amount you walk every day.  Try for at least 30 minutes on most days of the week. You also may want to swim, bike, or do other activities. · If finding enough time is a problem, it is fine to be active in blocks of 10 minutes or more throughout your day and week. · To get the heart-healthy benefits of walking, you need to walk briskly enough to increase your heart rate and breathing, but not so fast that you cannot talk comfortably. · Wear comfortable shoes that fit well and provide good support for your feet and ankles. Staying with your plan · After you've made walking a habit, set a longer-term goal. You may want to set a goal of walking briskly for longer or walking farther. Experts say to do 2½ hours of moderate activity a week. A faster heartbeat is what defines moderate-level activity. · To stay motivated, walk with friends, coworkers, or pets. · Use a phone ayana or pedometer to track your steps each day. Set a goal to increase your steps. Once you get there, set a higher goal. Aim for 10,000 steps a day. · If the weather keeps you from walking outside, go for walks at the mall with a friend. Local schools and churches may have indoor gyms where you can walk. Fitting a walk into your workday · Park several blocks away from work, or get off the bus a few stops early. · Use the stairs instead of the elevator, at least for a few floors. · Suggest holding meetings with colleagues during a walk inside or outside the building. · Use the restroom that is the farthest from your desk or workstation. · Use your morning and afternoon breaks to take quick 15-minute walks. Staying safe · Know your surroundings. Walk in a well-lighted, safe place. If it is dark, walk with a partner. Wear light-colored clothing. If you can, buy a vest or jacket that reflects light. · Carry a cell phone for emergencies. · Drink plenty of water. Take a water bottle with you when you walk. This is very important if it is hot out. · Be careful not to slip on wet or icy ground. You can buy \"grippers\" for your shoes to help keep you from slipping. · Pay attention to your walking surface. Use sidewalks and paths. · If you have breathing problems like asthma or COPD, ask your doctor when it is safe for you to walk outdoors. Cold, dry air, smog, pollen, or other things in the air could cause breathing problems. Where can you learn more? Go to http://cristina-melodie.info/. Enter R159 in the search box to learn more about \"Walking for Exercise: Care Instructions. \" Current as of: March 13, 2017 Content Version: 11.4 © 4390-4253 [employer]. Care instructions adapted under license by [employer] (which disclaims liability or warranty for this information). If you have questions about a medical condition or this instruction, always ask your healthcare professional. [address] any warranty or liability for your use of this information. Introducing [facility]! Dear Tracey Robison: Thank you for requesting a [employer] account. Our records indicate that you already have an active [employer] account. You can access your account anytime at [email or web address] Did you know that you can access your hospital and ER discharge instructions at any time in [employer]? You can also review all of your test results from your hospital stay or ER visit. Additional Information If you have questions, please visit the Frequently Asked Questions section of the [employer] website at [email or web address] Remember, [employer] is NOT to be used for urgent needs. For medical emergencies, dial 911. Now available from your iPhone and Android! Please provide this summary of care documentation to your next provider. Your primary care clinician is listed as Phys Other. If you have any questions after today's visit, please call [phone].

## 2018-03-22 NOTE — PROGRESS NOTES
Subjective:     Ken Bansal  is a 29 y.o. female who presents for follow-up about 1 year 2 months following laparoscopic gastric bypass surgery. She has lost a total of 171 pounds since surgery. Body mass index is 29.9 kg/(m^2). . EBWL is 75%. The patient presents today to assess their progress toward their goal of weight loss and to address any issues that may be present. Today the patient and I have reviewed their diet and how appropriate their food choices are. The following issues have been identified - none. Has not been seen since 6 months postop. Last seen 7/9/17, wanting to get back on track. Would like to get below 200 lbs. .  Surgery related complication: NA, but had severe folate deficiency and started on B12 injections along with folate pills. Needs labs checked. She reports no real issues and denies vomiting, abdominal pain and diarrhea. The patients diet choices have been reviewed today and are as follows:      Breakfast: egg, spinach quiche      Lunch: chicken breast, meatball      Snack: rarely      Supper : baked    Patients pain score:0/10      The patient's exercise level: moderately active. Changes in her medical history and medications have been reviewed.     Patient Active Problem List   Diagnosis Code    Morbid obesity with BMI of 50.0-59.9, adult ([facility]) E66.01, Z68.43    Morbid obesity ([facility] [address] E66.01    Hypothyroidism E03.9    Anxiety F41.9    EVI (stress urinary incontinence, female) N39.3    Snores R06.83    Irregular menses N92.6    Arthritic-like pain M25.50    Hypertension I10    Morbid obesity with body mass index of 50.0-59.9 in adult ([facility]) E66.01, Z68.43    S/P bariatric surgery Z98.84    Intestinal malabsorption K90.9     Past Medical History:   Diagnosis Date    Anxiety     Arthritic-like pain     knees    Hypertension     during preg    Hypothyroidism     Irregular menses     Morbid obesity ([address]     Morbid obesity with BMI of 50. 0-59.9, adult ([facility] [address]     Snores     EVI (stress urinary incontinence, female)      Past Surgical History:   Procedure Laterality Date    DILATION AND CURETTAGE      X 2 both for miscarriage    HX LAP CHOLECYSTECTOMY       Current Outpatient Prescriptions   Medication Sig Dispense Refill    folic acid (FOLVITE) 1 mg tablet TAKE ONE TABLET BY MOUTH ONCE A DAY 30 Tab 1    multivitamin (ONE A DAY) tablet Take 1 Tab by mouth daily.  nystatin (MYCOSTATIN) topical cream Apply  to affected area two (2) times a day. 30 g 3    CALCIUM CITRATE/VITAMIN D3 (CALCIUM CITRATE + PO) Take  by mouth.  Biotin 2,500 mcg cap Take  by mouth.  B.infantis-B.ani-B.long-B.bifi (PROBIOTIC 4X) 10-15 mg TbEC Take  by mouth.  cyanocobalamin (VITAMIN B12) 1,000 mcg/mL injection 1 mL by SubCUTAneous route every thirty (30) days. 3 mL 4    Syringe with Needle, Disp, 3 mL 25 x 5/8\" syrg 1 mL by SubCUTAneous route every thirty (30) days. 15 Syringe 0    citalopram (CELEXA) 40 mg tablet Take 40 mg by mouth every evening.  raNITIdine (ZANTAC) 300 mg tablet Take 1 Tab by mouth daily. 30 Tab 3    LEVOTHYROXINE SODIUM (SYNTHROID PO) Take  by mouth.           Review of Symptoms:       General - No history or complaints of unexpected fever or chills  Head/Neck - No history or complaints of headache or dizziness  Cardiac - No history or complaints of chest pain, palpitations, or shortness of breath  Pulmonary - No history or complaints of shortness of breath or productive cough  Gastrointestinal - as noted above  Genitourinary - No history or complaints of hematuria/dysuria or renal lithiasis  Musculoskeletal - No history or complaints of joint  muscular weakness  Hematologic - No history of any bleeding episodes  Neurologic - No history or complaints of  migraine headaches or neurologic symptoms                     Objective:     Visit Vitals    /62 (BP 1 Location: Left arm, BP Patient Position: Sitting)    Pulse 77    Ht 5' 9\" (1.753 m)    Wt 91.9 kg (202 lb 8 oz)    SpO2 99%    BMI 29.9 kg/m2        Physical Exam:    General:  alert, cooperative, no distress, appears stated age   Lungs:   clear to auscultation bilaterally   Heart:  Regular rate and rhythm, S1S2 present or without murmur or extra heart sounds   Abdomen:   abdomen is soft without significant tenderness, masses, organomegaly or guarding; Incisions: Well healed         Lab Results   Component Value Date/Time    WBC 7.9 07/19/2017 12:00 AM    HGB 13.0 07/19/2017 12:00 AM    HCT 39.5 07/19/2017 12:00 AM    PLATELET 434 83/26/7822 12:00 AM    MCV 88 07/19/2017 12:00 AM     Lab Results   Component Value Date/Time    Sodium 140 07/19/2017 12:00 AM    Potassium 3.8 07/19/2017 12:00 AM    Chloride 99 07/19/2017 12:00 AM    CO2 25 07/19/2017 12:00 AM    Anion gap 9 01/23/2017 12:17 PM    Glucose 76 07/19/2017 12:00 AM    BUN 11 07/19/2017 12:00 AM    Creatinine 0.58 07/19/2017 12:00 AM    BUN/Creatinine ratio 19 07/19/2017 12:00 AM    GFR est  07/19/2017 12:00 AM    GFR est non- 07/19/2017 12:00 AM    Calcium 9.1 07/19/2017 12:00 AM    Bilirubin, total 0.4 07/19/2017 12:00 AM    AST (SGOT) 19 07/19/2017 12:00 AM    Alk. phosphatase 53 07/19/2017 12:00 AM    Protein, total 6.4 07/19/2017 12:00 AM    Albumin 4.0 07/19/2017 12:00 AM    Globulin 3.9 01/23/2017 12:17 PM    A-G Ratio 1.7 07/19/2017 12:00 AM    ALT (SGPT) 16 07/19/2017 12:00 AM     Lab Results   Component Value Date/Time    Iron 72 07/19/2017 12:00 AM    Ferritin 32 07/19/2017 12:00 AM     Lab Results   Component Value Date/Time    Folate 2.8 (L) 07/19/2017 12:00 AM     Lab Results   Component Value Date/Time    VITAMIN D, 25-HYDROXY 35.4 07/19/2017 12:00 AM             Assessment:     1. History of Morbid obesity, status post  laparoscopic gastric bypass surgery. Doing well; no concerns. 2. Hx of Folate deficiency  3. Hx of B12 deficiency    Plan:     1.  Remember to measure portions, continue low carbohydrate diet  2. Continue to concentrate on protein intake meeting daily requirements  3. Remember vitamin supplements. The importance of such was discussed regarding the malabsorptive issues that the surgery creates. 4. Exercise regimen appears to be: adequate  5. Try and attend support group if feasible. 6. Follow-up in 6 month(s). 7. Labs will be reviewed and appropriate changes made once received (pending now). 8. Total time spent with the patient 30 minutes.

## 2018-03-26 LAB
25(OH)D3+25(OH)D2 SERPL-MCNC: 22.5 NG/ML (ref 30–100)
ALBUMIN SERPL-MCNC: 4 G/DL (ref 3.5–5.5)
ALBUMIN/GLOB SERPL: 1.6 {RATIO} (ref 1.2–2.2)
ALP SERPL-CCNC: 54 IU/L (ref 39–117)
ALT SERPL-CCNC: 7 IU/L (ref 0–32)
AST SERPL-CCNC: 10 IU/L (ref 0–40)
BASOPHILS # BLD AUTO: 0 X10E3/UL (ref 0–0.2)
BASOPHILS NFR BLD AUTO: 0 %
BILIRUB SERPL-MCNC: 0.4 MG/DL (ref 0–1.2)
BUN SERPL-MCNC: 12 MG/DL (ref 6–20)
BUN/CREAT SERPL: 21 (ref 9–23)
CALCIUM SERPL-MCNC: 9.3 MG/DL (ref 8.7–10.2)
CHLORIDE SERPL-SCNC: 103 MMOL/L (ref 96–106)
CO2 SERPL-SCNC: 28 MMOL/L (ref 18–29)
CREAT SERPL-MCNC: 0.57 MG/DL (ref 0.57–1)
EOSINOPHIL # BLD AUTO: 0.1 X10E3/UL (ref 0–0.4)
EOSINOPHIL NFR BLD AUTO: 2 %
ERYTHROCYTE [DISTWIDTH] IN BLOOD BY AUTOMATED COUNT: 13.5 % (ref 12.3–15.4)
FERRITIN SERPL-MCNC: 29 NG/ML (ref 15–150)
FOLATE SERPL-MCNC: 13 NG/ML
GFR SERPLBLD CREATININE-BSD FMLA CKD-EPI: 125 ML/MIN/1.73
GFR SERPLBLD CREATININE-BSD FMLA CKD-EPI: 144 ML/MIN/1.73
GLOBULIN SER CALC-MCNC: 2.5 G/DL (ref 1.5–4.5)
GLUCOSE SERPL-MCNC: 90 MG/DL (ref 65–99)
HCT VFR BLD AUTO: 39.6 % (ref 34–46.6)
HGB BLD-MCNC: 13.3 G/DL (ref 11.1–15.9)
IMM GRANULOCYTES # BLD: 0 X10E3/UL (ref 0–0.1)
IMM GRANULOCYTES NFR BLD: 0 %
IRON SERPL-MCNC: 110 UG/DL (ref 27–159)
LYMPHOCYTES # BLD AUTO: 3 X10E3/UL (ref 0.7–3.1)
LYMPHOCYTES NFR BLD AUTO: 45 %
MCH RBC QN AUTO: 29.4 PG (ref 26.6–33)
MCHC RBC AUTO-ENTMCNC: 33.6 G/DL (ref 31.5–35.7)
MCV RBC AUTO: 87 FL (ref 79–97)
MONOCYTES # BLD AUTO: 0.5 X10E3/UL (ref 0.1–0.9)
MONOCYTES NFR BLD AUTO: 8 %
NEUTROPHILS # BLD AUTO: 3 X10E3/UL (ref 1.4–7)
NEUTROPHILS NFR BLD AUTO: 45 %
PLATELET # BLD AUTO: 244 X10E3/UL (ref 150–379)
POTASSIUM SERPL-SCNC: 4.4 MMOL/L (ref 3.5–5.2)
PROT SERPL-MCNC: 6.5 G/DL (ref 6–8.5)
RBC # BLD AUTO: 4.53 X10E6/UL (ref 3.77–5.28)
SODIUM SERPL-SCNC: 141 MMOL/L (ref 134–144)
VIT B1 BLD-SCNC: 122.2 NMOL/L (ref 66.5–200)
VIT B12 SERPL-MCNC: 294 PG/ML (ref 232–1245)
WBC # BLD AUTO: 6.6 X10E3/UL (ref 3.4–10.8)

## 2018-03-27 ENCOUNTER — TELEPHONE (OUTPATIENT)
Dept: SURGERY | Age: 33
End: 2018-03-27

## 2018-03-27 RX ORDER — ERGOCALCIFEROL 1.25 MG/1
50000 CAPSULE ORAL 2 TIMES WEEKLY
Qty: 52 CAP | Refills: 1 | Status: SHIPPED | OUTPATIENT
Start: 2018-03-30 | End: 2018-09-26

## 2018-03-27 NOTE — TELEPHONE ENCOUNTER
----- Message from Ethel Halsted, MD sent at 3/26/2018 12:33 PM EDT -----  Call in Vitamin D 50,000 units twice weekly for 1 year

## 2018-07-16 DIAGNOSIS — E53.8 B12 DEFICIENCY: ICD-10-CM

## 2018-07-16 DIAGNOSIS — E53.8 FOLATE DEFICIENCY: ICD-10-CM

## 2018-07-16 RX ORDER — CYANOCOBALAMIN 1000 UG/ML
1000 INJECTION, SOLUTION INTRAMUSCULAR; SUBCUTANEOUS
Qty: 3 ML | Refills: 4
Start: 2018-07-16 | End: 2018-07-19 | Stop reason: SDUPTHER

## 2018-07-19 DIAGNOSIS — E53.8 B12 DEFICIENCY: ICD-10-CM

## 2018-07-19 DIAGNOSIS — E53.8 FOLATE DEFICIENCY: ICD-10-CM

## 2018-07-19 RX ORDER — CYANOCOBALAMIN 1000 UG/ML
1000 INJECTION, SOLUTION INTRAMUSCULAR; SUBCUTANEOUS
Qty: 3 ML | Refills: 4 | Status: SHIPPED | OUTPATIENT
Start: 2018-07-19 | End: 2019-01-24 | Stop reason: SDUPTHER

## 2019-01-24 ENCOUNTER — OFFICE VISIT (OUTPATIENT)
Dept: SURGERY | Age: 34
End: 2019-01-24

## 2019-01-24 VITALS
DIASTOLIC BLOOD PRESSURE: 61 MMHG | HEART RATE: 72 BPM | OXYGEN SATURATION: 100 % | BODY MASS INDEX: 28.6 KG/M2 | WEIGHT: 193.1 LBS | HEIGHT: 69 IN | SYSTOLIC BLOOD PRESSURE: 111 MMHG | TEMPERATURE: 98 F

## 2019-01-24 DIAGNOSIS — K90.89 OTHER SPECIFIED INTESTINAL MALABSORPTION: Primary | ICD-10-CM

## 2019-01-24 DIAGNOSIS — Z98.84 S/P BARIATRIC SURGERY: ICD-10-CM

## 2019-01-24 DIAGNOSIS — E53.8 FOLATE DEFICIENCY: ICD-10-CM

## 2019-01-24 DIAGNOSIS — E53.8 B12 DEFICIENCY: ICD-10-CM

## 2019-01-24 DIAGNOSIS — E55.9 HYPOVITAMINOSIS D: ICD-10-CM

## 2019-01-24 DIAGNOSIS — K90.89 OTHER SPECIFIED INTESTINAL MALABSORPTION: ICD-10-CM

## 2019-01-24 RX ORDER — CYANOCOBALAMIN 1000 UG/ML
1000 INJECTION, SOLUTION INTRAMUSCULAR; SUBCUTANEOUS
Qty: 3 ML | Refills: 4 | Status: SHIPPED | OUTPATIENT
Start: 2019-01-24 | End: 2019-09-06

## 2019-01-24 RX ORDER — ERGOCALCIFEROL 1.25 MG/1
50000 CAPSULE ORAL 2 TIMES WEEKLY
Qty: 52 CAP | Refills: 0 | Status: SHIPPED | OUTPATIENT
Start: 2019-01-25 | End: 2019-06-18 | Stop reason: SDUPTHER

## 2019-01-24 NOTE — PATIENT INSTRUCTIONS
Patient Instructions 1. Remember hydration goals - minimum of 64 ounces of liquids per day (dehydration is the number one reason for hospital readmission). 2. Continue to monitor carbohydrate and protein intake you need a minimum of  Grams of protein daily- remember to keep your total carbohydrates to 50 grams or less per day for best results. 3. Continue to work towards exercise goals - 60-90 minutes, 5 times a week minimum of deliberate, aerobic exercise is the ultimate goal with strength training 2 times each week. Refer to [facility]) for  information. 4. Remember to take vitamins as directed. 5. Attend support group the 2nd Thursday of each month. 6. Use Miralax if you become constipated. 7. Call us at [phone] or email us through [email or web address]" with questions,     concerns or worsening of condition, we have someone on call 24 hours a day. If you are unable to reach our office, you are to go to your Primary Care Physician or the Emergency Department. Supplement Resource Guide Importance of Protein:  
Maintains lean body mass, produces antibodies to fight off infections, heals wounds, minimizes hair loss, helps to give you energy, helps with satiety, and keeping you full between meals. Importance of Calcium: 
Needed for healthy bones and teeth, normal blood clotting, and nervous system functioning, higher risk of osteoporosis and bone disease with non-compliance. Importance of Multivitamins: Many functions. Supply you with extra nutrients that you may be missing from food. May lead to iron deficiency anemia, weakness, fatigue, and many other symptoms with non-compliance. Importance of B Vitamins: 
Important for red blood cell formation, metabolism, energy, and helps to maintain a healthy nervous system. Protein Supplement Find one you like now. Use immediately after surgery. Look for: 35-50g protein each day from your protein supplement once you reach the progression diet. 0-3 g fat per serving 0-3 g sugar per serving Protein drinks should be split in separate dosages. Recommend: Lifelong 1 year +Calcium Supplement:  
 
Start taking within a month after surgery. Look for: Calcium Citrate Plus D (1500 mg per day) Recommend: Citracal 
 
 . Avoid chocolate chewable calcium. Can use chewable bariatric or [employer] brand or similar chewable. The body cannot absorb more than 500-600 mg of calcium at a time. Take for Life Multi-vitamin Supplement:   
Start immediately after surgery: any complete chewable, such as: [place]s Complete chewables. Avoid [place] sours or gummies. They lack iron and other important nutrients and also have added sugar. Continue with chewable vitamin or change to adult complete multivitamin one month after surgery. Menstruating women can take a prenatal vitamin. Make sure has at least 18 mg iron and 050-785 mcg folic acid Vitamin B12, B Complex Vitamin, and Biotin Start taking within a month after surgery. Vitamin B12:  1000 mcg of Vitamin B12 three times weekly Must take sublingually (meaning you take it under your tongue) or in a liquid drop form for easy absorption. B Complex Vitamin: Take a pill or liquid drop form once daily. Biotin: This vitamin can help prevent hair loss. Recommend 5mg  
(5000 mcg) a day Biotin is Optional  
 
 
 
Walking for Exercise: Care Instructions Your Care Instructions Walking is one of the easiest ways to get the exercise you need for good health. A brisk, 30-minute walk each day can help you feel better and have more energy. It can help you lower your risk of disease. Walking can help you keep your bones strong and your heart healthy. Check with your doctor before you start a walking plan if you have heart problems, other health issues, or you have not been active in a long time. Follow your doctor's instructions for safe levels of exercise. Follow-up care is a key part of your treatment and safety. Be sure to make and go to all appointments, and call your doctor if you are having problems. It's also a good idea to know your test results and keep a list of the medicines you take. How can you care for yourself at home? Getting started · Start slowly and set a short-term goal. For example, walk for 5 or 10 minutes every day. · Bit by bit, increase the amount you walk every day. Try for at least 30 minutes on most days of the week. You also may want to swim, bike, or do other activities. · If finding enough time is a problem, it is fine to be active in blocks of 10 minutes or more throughout your day and week. · To get the heart-healthy benefits of walking, you need to walk briskly enough to increase your heart rate and breathing, but not so fast that you cannot talk comfortably. · Wear comfortable shoes that fit well and provide good support for your feet and ankles. Staying with your plan · After you've made walking a habit, set a longer-term goal. You may want to set a goal of walking briskly for longer or walking farther. Experts say to do 2½ hours of moderate activity a week. A faster heartbeat is what defines moderate-level activity. · To stay motivated, walk with friends, coworkers, or pets. · Use a phone ayana or pedometer to track your steps each day. Set a goal to increase your steps. Once you get there, set a higher goal. Aim for 10,000 steps a day. · If the weather keeps you from walking outside, go for walks at the mall with a friend. Local schools and churches may have indoor gyms where you can walk. Fitting a walk into your workday · Park several blocks away from work, or get off the bus a few stops early. · Use the stairs instead of the elevator, at least for a few floors. · Suggest holding meetings with colleagues during a walk inside or outside the building. · Use the restroom that is the farthest from your desk or workstation. · Use your morning and afternoon breaks to take quick 15-minute walks. Staying safe · Know your surroundings. Walk in a well-lighted, safe place. If it is dark, walk with a partner. Wear light-colored clothing. If you can, buy a vest or jacket that reflects light. · Carry a cell phone for emergencies. · Drink plenty of water. Take a water bottle with you when you walk. This is very important if it is hot out. · Be careful not to slip on wet or icy ground. You can buy \"grippers\" for your shoes to help keep you from slipping. · Pay attention to your walking surface. Use sidewalks and paths. · If you have breathing problems like asthma or COPD, ask your doctor when it is safe for you to walk outdoors. Cold, dry air, smog, pollen, or other things in the air could cause breathing problems. Where can you learn more? Go to http://cristina-melodie.info/. Enter R159 in the search box to learn more about \"Walking for Exercise: Care Instructions. \" Current as of: December 7, 2017 Content Version: 11.8 © 8423-1559 [employer]. Care instructions adapted under license by [employer] (which disclaims liability or warranty for this information). If you have questions about a medical condition or this instruction, always ask your healthcare professional. [place] [address] any warranty or liability for your use of this information.

## 2019-01-24 NOTE — PROGRESS NOTES
Subjective:  
 
Mamta Capone  is a 34 y.o. female who presents for follow-up about 2 years following laparoscopic gastric bypass surgery. She has lost a total of 180 pounds since surgery. Body mass index is 28.52 kg/m². . EBWL is (79%). The patient presents today to assess their progress toward their goal of weight loss and to address any issues that may be present. Today the patient and I have reviewed their diet and how appropriate their food choices are. The following issues have been identified - none from a surgical standpoint. Surgery related complication: NA She reports dumping syndrome when she eats too much sugar and denies vomiting, abdominal pain and diarrhea. The patients diet choices have been reviewed today and are as follows: 
    Breakfast: skips Lunch: chicken Snack: PB crackers Supper :whatever making for family Patients pain score:0/10 The patient's exercise level: moderately active, walks 45 minutes 4 days a week. Changes in her medical history and medications have been reviewed. Has not been taking vit D Rx, after never got it refilled from initial bottle of 4 capsules. Admits to being out of B12 for past 2 months. Comorbidities: Hypertension: resolved Diabetes: not applicable Obstructive Sleep Apnea: not applicable Hyperlipidemia: not applicable Stress Urinary Incontinence: resolved Gastroesophageal Reflux: not applicable Weight related arthropathy:resolved Patient Active Problem List  
Diagnosis Code  Anxiety F41.9  EVI (stress urinary incontinence, female) N39.3  Snores R06.83  
 Irregular menses N92.6  Arthritic-like pain M25.50  Hypertension I10  
 S/P bariatric surgery Z98.84  
 Intestinal malabsorption K90.9  Folate deficiency E53.8  B12 deficiency E53.8  Hypovitaminosis D E55.9 Past Medical History:  
Diagnosis Date  Anxiety  Arthritic-like pain   
 knees  Hypertension   
 during preg  Hypothyroidism  DONALD (iron deficiency anemia)  Irregular menses  Morbid obesity ([facility] [address]  Morbid obesity with BMI of 50.0-59.9, adult ([facility] [address]  Snores  EVI (stress urinary incontinence, female) Past Surgical History:  
Procedure Laterality Date  DILATION AND CURETTAGE    
 X 2 both for miscarriage  HX LAP CHOLECYSTECTOMY Current Outpatient Medications Medication Sig Dispense Refill  cyanocobalamin (VITAMIN B12) 1,000 mcg/mL injection 1 mL by SubCUTAneous route every thirty (30) days. Indications: PREVENTION OF VITAMIN B12 DEFICIENCY, Vitamin B12 Deficiency 3 mL 4  Syringe with Needle, Disp, 3 mL 25 x 5/8\" syrg 1 mL by SubCUTAneous route every thirty (30) days. 15 Syringe 0  
 folic acid (FOLVITE) 1 mg tablet Take 1 Tab by mouth daily. 30 Tab 5  
 multivitamin (ONE A DAY) tablet Take 1 Tab by mouth daily.  nystatin (MYCOSTATIN) topical cream Apply  to affected area two (2) times a day. 30 g 3  
 CALCIUM CITRATE/VITAMIN D3 (CALCIUM CITRATE + PO) Take  by mouth.  Biotin 2,500 mcg cap Take  by mouth.  B.infantis-B.ani-B.long-B.bifi (PROBIOTIC 4X) 10-15 mg TbEC Take  by mouth.  citalopram (CELEXA) 40 mg tablet Take 40 mg by mouth every evening. Review of Symptoms:  
 
 
General - No history or complaints of unexpected fever or chills Head/Neck - No history or complaints of headache or dizziness Cardiac - No history or complaints of chest pain, palpitations, or shortness of breath Pulmonary - No history or complaints of shortness of breath or productive cough Gastrointestinal - as noted above Genitourinary - No history or complaints of hematuria/dysuria or renal lithiasis Musculoskeletal - No history or complaints of joint  muscular weakness Hematologic - No history of any bleeding episodes Neurologic - No history or complaints of  migraine headaches or neurologic symptoms Objective:  
 
Visit Vitals /61 (BP 1 Location: Left arm, BP Patient Position: Sitting) Pulse 72 Temp 98 °F (36.7 °C) Ht 5' 9\" (1.753 m) Wt 87.6 kg (193 lb 1.6 oz) SpO2 100% BMI 28.52 kg/m² Physical Exam: 
 
General:  alert, cooperative, no distress, appears stated age Lungs:   clear to auscultation bilaterally Heart:  Regular rate and rhythm, S1S2 present or without murmur or extra heart sounds Abdomen:   abdomen is soft without significant tenderness, masses, organomegaly or guarding; Incisions: healed Lab Results Component Value Date/Time WBC 6.6 03/20/2018 12:00 AM  
 HGB 13.3 03/20/2018 12:00 AM  
 HCT 39.6 03/20/2018 12:00 AM  
 PLATELET 716 81/55/0828 12:00 AM  
 MCV 87 03/20/2018 12:00 AM  
 
Lab Results Component Value Date/Time Sodium 141 03/20/2018 12:00 AM  
 Potassium 4.4 03/20/2018 12:00 AM  
 Chloride 103 03/20/2018 12:00 AM  
 CO2 28 03/20/2018 12:00 AM  
 Anion gap 9 01/23/2017 12:17 PM  
 Glucose 90 03/20/2018 12:00 AM  
 BUN 12 03/20/2018 12:00 AM  
 Creatinine 0.57 03/20/2018 12:00 AM  
 BUN/Creatinine ratio 21 03/20/2018 12:00 AM  
 GFR est  03/20/2018 12:00 AM  
 GFR est non- 03/20/2018 12:00 AM  
 Calcium 9.3 03/20/2018 12:00 AM  
 Bilirubin, total 0.4 03/20/2018 12:00 AM  
 AST (SGOT) 10 03/20/2018 12:00 AM  
 Alk. phosphatase 54 03/20/2018 12:00 AM  
 Protein, total 6.5 03/20/2018 12:00 AM  
 Albumin 4.0 03/20/2018 12:00 AM  
 Globulin 3.9 01/23/2017 12:17 PM  
 A-G Ratio 1.6 03/20/2018 12:00 AM  
 ALT (SGPT) 7 03/20/2018 12:00 AM  
 
Lab Results Component Value Date/Time Iron 110 03/20/2018 12:00 AM  
 Ferritin 29 03/20/2018 12:00 AM  
 
Lab Results Component Value Date/Time Folate 13.0 03/20/2018 12:00 AM  
 
Lab Results Component Value Date/Time VITAMIN D, 25-HYDROXY 22.5 (L) 03/20/2018 12:00 AM  
   
 
 
Assessment:  
 
1.  History of Morbid obesity, status post  laparoscopic gastric bypass surgery. Doing well; no concerns. Is happy with her weight loss, she is within 10 pounds of her goal. Is aware that she has been increasing simple carbs and plans to make some dietary changes. 2. Hypovit D - resume Rx 3. Hx of B12/folate deficiency - resume Rx Plan: 1. Remember to measure portions, continue low carbohydrate diet 2. Continue to concentrate on protein intake meeting daily requirements 3. Remember vitamin supplements. The importance of such was discussed regarding the malabsorptive issues that the surgery creates. 4. Exercise regimen appears to be: adequate 5. Try and attend support group if feasible. 6. Follow-up in 1 year(s). 7. Lab reviewed and appropriate changes made. Lab slip given to check labs in March 8. Total time spent with the patient 30 minutes.

## 2019-02-25 LAB
25(OH)D3+25(OH)D2 SERPL-MCNC: 18.9 NG/ML (ref 30–100)
ALBUMIN SERPL-MCNC: 4.3 G/DL (ref 3.5–5.5)
ALBUMIN/GLOB SERPL: 2 {RATIO} (ref 1.2–2.2)
ALP SERPL-CCNC: 44 IU/L (ref 39–117)
ALT SERPL-CCNC: 12 IU/L (ref 0–32)
AST SERPL-CCNC: 17 IU/L (ref 0–40)
BASOPHILS # BLD AUTO: 0 X10E3/UL (ref 0–0.2)
BASOPHILS NFR BLD AUTO: 0 %
BILIRUB SERPL-MCNC: 0.5 MG/DL (ref 0–1.2)
BUN SERPL-MCNC: 12 MG/DL (ref 6–20)
BUN/CREAT SERPL: 19 (ref 9–23)
CALCIUM SERPL-MCNC: 8.9 MG/DL (ref 8.7–10.2)
CHLORIDE SERPL-SCNC: 101 MMOL/L (ref 96–106)
CO2 SERPL-SCNC: 24 MMOL/L (ref 20–29)
CREAT SERPL-MCNC: 0.64 MG/DL (ref 0.57–1)
EOSINOPHIL # BLD AUTO: 0.1 X10E3/UL (ref 0–0.4)
EOSINOPHIL NFR BLD AUTO: 2 %
ERYTHROCYTE [DISTWIDTH] IN BLOOD BY AUTOMATED COUNT: 13.6 % (ref 12.3–15.4)
FERRITIN SERPL-MCNC: 10 NG/ML (ref 15–150)
FOLATE SERPL-MCNC: 10 NG/ML
GLOBULIN SER CALC-MCNC: 2.2 G/DL (ref 1.5–4.5)
GLUCOSE SERPL-MCNC: 81 MG/DL (ref 65–99)
HCT VFR BLD AUTO: 37.4 % (ref 34–46.6)
HGB BLD-MCNC: 12.6 G/DL (ref 11.1–15.9)
IMM GRANULOCYTES # BLD AUTO: 0 X10E3/UL (ref 0–0.1)
IMM GRANULOCYTES NFR BLD AUTO: 0 %
IRON SERPL-MCNC: 134 UG/DL (ref 27–159)
LYMPHOCYTES # BLD AUTO: 2.2 X10E3/UL (ref 0.7–3.1)
LYMPHOCYTES NFR BLD AUTO: 42 %
MCH RBC QN AUTO: 29.9 PG (ref 26.6–33)
MCHC RBC AUTO-ENTMCNC: 33.7 G/DL (ref 31.5–35.7)
MCV RBC AUTO: 89 FL (ref 79–97)
MONOCYTES # BLD AUTO: 0.7 X10E3/UL (ref 0.1–0.9)
MONOCYTES NFR BLD AUTO: 12 %
NEUTROPHILS # BLD AUTO: 2.3 X10E3/UL (ref 1.4–7)
NEUTROPHILS NFR BLD AUTO: 44 %
PLATELET # BLD AUTO: 227 X10E3/UL (ref 150–379)
POTASSIUM SERPL-SCNC: 4.2 MMOL/L (ref 3.5–5.2)
PROT SERPL-MCNC: 6.5 G/DL (ref 6–8.5)
RBC # BLD AUTO: 4.22 X10E6/UL (ref 3.77–5.28)
SODIUM SERPL-SCNC: 140 MMOL/L (ref 134–144)
VIT B1 BLD-SCNC: 102.6 NMOL/L (ref 66.5–200)
VIT B12 SERPL-MCNC: 304 PG/ML (ref 232–1245)
WBC # BLD AUTO: 5.3 X10E3/UL (ref 3.4–10.8)

## 2019-02-26 ENCOUNTER — TELEPHONE (OUTPATIENT)
Dept: SURGERY | Age: 34
End: 2019-02-26

## 2019-02-26 DIAGNOSIS — R79.0 LOW FERRITIN: Primary | ICD-10-CM

## 2019-02-26 DIAGNOSIS — K90.9 INTESTINAL MALABSORPTION, UNSPECIFIED TYPE: ICD-10-CM

## 2019-02-26 DIAGNOSIS — Z98.84 S/P BARIATRIC SURGERY: ICD-10-CM

## 2019-02-27 ENCOUNTER — TELEPHONE (OUTPATIENT)
Dept: SURGERY | Age: 34
End: 2019-02-27

## 2019-02-27 NOTE — TELEPHONE ENCOUNTER
Attempted to leave message concerns lab results and plan of care. Voice mailbox full. Will mail letter.

## 2019-02-27 NOTE — LETTER
Ania Jama 
[address] [facility], [address] [facility] Dear MsMaryann Bryn Fernándezrod, 
 
 
 
I have tried to contact you to discuss the results of your recent lab work without success. Please call our office at your earliest convenience. Thank you, Vamsi Tarango, PAULA-BC

## 2019-02-28 ENCOUNTER — TELEPHONE (OUTPATIENT)
Dept: SURGERY | Age: 34
End: 2019-02-28

## 2019-06-21 RX ORDER — ERGOCALCIFEROL 1.25 MG/1
50000 CAPSULE ORAL 2 TIMES WEEKLY
Qty: 52 CAP | Refills: 0 | Status: SHIPPED | OUTPATIENT
Start: 2019-06-21 | End: 2019-09-06

## 2019-09-02 PROBLEM — Z3A.01 LESS THAN 8 WEEKS GESTATION OF PREGNANCY: Status: ACTIVE | Noted: 2019-09-02

## 2019-09-10 PROBLEM — O20.9 VAGINAL BLEEDING IN PREGNANCY, FIRST TRIMESTER: Status: ACTIVE | Noted: 2019-09-10

## 2019-09-17 PROBLEM — O41.8X10 SUBCHORIONIC HEMATOMA IN FIRST TRIMESTER: Status: ACTIVE | Noted: 2019-09-17

## 2019-09-17 PROBLEM — O46.8X1 SUBCHORIONIC HEMATOMA IN FIRST TRIMESTER: Status: ACTIVE | Noted: 2019-09-17

## 2019-09-18 PROBLEM — O23.591 TRICHOMONAL VAGINITIS DURING PREGNANCY IN FIRST TRIMESTER: Status: ACTIVE | Noted: 2019-09-18

## 2019-09-18 PROBLEM — A59.01 TRICHOMONAL VAGINITIS DURING PREGNANCY IN FIRST TRIMESTER: Status: ACTIVE | Noted: 2019-09-18

## 2019-09-18 LAB
ANTIBODY SCREEN, EXTERNAL: NEGATIVE
CHLAMYDIA, EXTERNAL: NEGATIVE
HBSAG, EXTERNAL: NEGATIVE
HIV, EXTERNAL: NEGATIVE
N. GONORRHEA, EXTERNAL: NEGATIVE
RPR, EXTERNAL: NORMAL
RUBELLA, EXTERNAL: NORMAL
TYPE, ABO & RH, EXTERNAL: NORMAL

## 2019-09-22 PROBLEM — R79.89 LOW SERUM VITAMIN D: Status: ACTIVE | Noted: 2019-09-22

## 2019-10-01 PROBLEM — O41.8X10 SUBCHORIONIC HEMATOMA IN FIRST TRIMESTER: Status: RESOLVED | Noted: 2019-09-17 | Resolved: 2019-10-01

## 2019-10-01 PROBLEM — O46.8X1 SUBCHORIONIC HEMATOMA IN FIRST TRIMESTER: Status: RESOLVED | Noted: 2019-09-17 | Resolved: 2019-10-01

## 2020-01-08 ENCOUNTER — HOSPITAL ENCOUNTER (OUTPATIENT)
Age: 35
Discharge: HOME | End: 2020-01-08
Attending: OBSTETRICS & GYNECOLOGY | Admitting: OBSTETRICS & GYNECOLOGY
Payer: COMMERCIAL

## 2020-01-08 VITALS
SYSTOLIC BLOOD PRESSURE: 106 MMHG | DIASTOLIC BLOOD PRESSURE: 40 MMHG | TEMPERATURE: 98.2 F | BODY MASS INDEX: 31.84 KG/M2 | WEIGHT: 215 LBS | RESPIRATION RATE: 16 BRPM | HEART RATE: 73 BPM | HEIGHT: 69 IN

## 2020-01-08 PROBLEM — Z3A.23 23 WEEKS GESTATION OF PREGNANCY: Status: ACTIVE | Noted: 2020-01-08

## 2020-01-08 LAB — FIBRONECTIN FETAL VAG QL: NEGATIVE

## 2020-01-08 PROCEDURE — 99282 EMERGENCY DEPT VISIT SF MDM: CPT

## 2020-01-08 PROCEDURE — 82731 ASSAY OF FETAL FIBRONECTIN: CPT

## 2020-01-08 NOTE — DISCHARGE INSTRUCTIONS
Keep all scheduled appointments  Drink plenty of fluids and eat multiple meals throughout the day. Come back to L&D if you water breaks, have bright red vaginal bleeding, contractions every 3-5 minutes, increasing in intensity.  Labor: Care Instructions  Your Care Instructions     labor is the start of labor between 21 and 36 weeks of pregnancy. Most babies are born at 40 to 41 weeks of pregnancy. In labor, the uterus contracts to open the cervix. This is the first stage of childbirth.  labor can be caused by a problem with the baby, the mother, or both. Often the cause is not known. In some cases, doctors use medicines to try to delay labor until 29 or more weeks of pregnancy. By this time, a baby has grown enough so that problems are not likely. In some cases--such as with a serious infection--it is healthier for the baby to be born early. Your treatment will depend on how far along you are in your pregnancy and on your health and your baby's health. Follow-up care is a key part of your treatment and safety. Be sure to make and go to all appointments, and call your doctor if you are having problems. It's also a good idea to know your test results and keep a list of the medicines you take. How can you care for yourself at home? · If your doctor prescribed medicines, take them exactly as directed. Call your doctor if you think you are having a problem with your medicine. · Rest until your doctor advises you about activity. He or she will tell you if you should stay in bed most of the time. You may need to arrange for  if you have young children. · Do not have sexual intercourse unless your doctor says it is safe. · Use pads, not tampons, if you have vaginal bleeding. · Make sure to drink plenty of fluids. Dehydration can lead to contractions.  If you have kidney, heart, or liver disease and have to limit fluids, talk with your doctor before you increase the amount of fluids you drink. · Do not smoke or allow others to smoke around you. If you need help quitting, talk to your doctor about stop-smoking programs and medicines. These can increase your chances of quitting for good. When should you call for help? Call 911 anytime you think you may need emergency care. For example, call if:    · You passed out (lost consciousness).     · You have a seizure.     · You have severe vaginal bleeding.     · You have severe pain in your belly or pelvis.     · You have had fluid gushing or leaking from your vagina and you know or think the umbilical cord is bulging into your vagina. If this happens, immediately get down on your knees so your rear end (buttocks) is higher than your head. This will decrease the pressure on the cord until help arrives.   [facility] your doctor now or seek immediate medical care if:    · You have signs of preeclampsia, such as:  ? Sudden swelling of your face, hands, or feet. ? New vision problems (such as dimness, blurring, or seeing spots). ? A severe headache.     · You have any vaginal bleeding.     · You have belly pain or cramping.     · You have a fever.     · You have had regular contractions (with or without pain) for an hour. This means that you have 6 or more within 1 hour after you change your position and drink fluids.     · You have a sudden release of fluid from the vagina.     · You have low back pain or pelvic pressure that does not go away.     · You notice that your baby has stopped moving or is moving much less than normal.    Watch closely for changes in your health, and be sure to contact your doctor if you have any problems. Where can you learn more? Go to http://cristina-melodie.info/. Enter Q400 in the search box to learn more about \" Labor: Care Instructions. \"  Current as of: May 29, 2019  Content Version: 12.2  © 8139-5632 [employer].  Care instructions adapted under license by [facility] (which disclaims liability or warranty for this information). If you have questions about a medical condition or this instruction, always ask your healthcare professional. [place] [address] any warranty or liability for your use of this information. Weeks 22 to 26 of Your Pregnancy: Care Instructions  Your Care Instructions    As you enter your 7th month of pregnancy at week 26, your baby's lungs are growing stronger and getting ready to breathe. You may notice that your baby responds to the sound of your or your partner's voice. You may also notice that your baby does less turning and twisting and more squirming or jerking. Jerking often means that your baby has the hiccups. Hiccups are perfectly normal and are only temporary. You may want to think about attending a childbirth preparation class. This is also a good time to start thinking about whether you want to have pain medicine during labor. Most pregnant women are tested for gestational diabetes between weeks 25 and 28. Gestational diabetes occurs when your blood sugar level gets too high when you're pregnant. The test is important, because you can have gestational diabetes and not know it. But the condition can cause problems for your baby. Follow-up care is a key part of your treatment and safety. Be sure to make and go to all appointments, and call your doctor if you are having problems. It's also a good idea to know your test results and keep a list of the medicines you take. How can you care for yourself at home? Ease discomfort from your baby's kicking  · Change your position. Sometimes this will cause your baby to change position too. · Take a deep breath while you raise your arm over your head. Then breathe out while you drop your arm. Do Kegel exercises to prevent urine from leaking  · You can do Kegel exercises while you stand or sit. ? Squeeze the same muscles you would use to stop your urine.  Your belly and thighs should not move. ? Hold the squeeze for 3 seconds, and then relax for 3 seconds. ? Start with 3 seconds. Then add 1 second each week until you are able to squeeze for 10 seconds. ? Repeat the exercise 10 to 15 times for each session. Do three or more sessions each day. Ease or reduce swelling in your feet, ankles, hands, and fingers  · If your fingers are puffy, take off your rings. · Do not eat high-salt foods, such as potato chips. · Prop up your feet on a stool or couch as much as possible. Sleep with pillows under your feet. · Do not stand for long periods of time or wear tight shoes. · Wear support stockings. Where can you learn more? Go to http://cristina-melodie.info/. Enter G264 in the search box to learn more about \"Weeks 22 to 26 of Your Pregnancy: Care Instructions. \"  Current as of: May 29, 2019  Content Version: 12.2  © 7533-4767 [employer]. Care instructions adapted under license by [employer] (which disclaims liability or warranty for this information). If you have questions about a medical condition or this instruction, always ask your healthcare professional. [place] [address] any warranty or liability for your use of this information.

## 2020-01-08 NOTE — PROGRESS NOTES
1650  24+4 weeks gestation with ffn from OB's office. 1833 No contractions or irritability noted on [address] with mario Correa to discharge home and follow up with OB at next appointment. 200 Pt rec'd discharge instructions, verbalized understanding and signed. Pt ambulated off unit.

## 2020-01-30 PROBLEM — O35.EXX0 ENCOUNTER FOR ULTRASOUND RECHECK OF FETAL PYELECTASIS, ANTEPARTUM: Status: ACTIVE | Noted: 2020-01-30

## 2020-04-26 PROBLEM — O48.0 POST TERM PREGNANCY OVER 40 WEEKS: Status: ACTIVE | Noted: 2020-04-26

## 2020-04-27 ENCOUNTER — HOSPITAL ENCOUNTER (INPATIENT)
Age: 35
LOS: 2 days | Discharge: HOME | End: 2020-04-29
Attending: OBSTETRICS & GYNECOLOGY | Admitting: OBSTETRICS & GYNECOLOGY
Payer: COMMERCIAL

## 2020-04-27 LAB
ABO + RH BLD: NORMAL
BASOPHILS # BLD: 0 K/UL (ref 0–0.1)
BASOPHILS NFR BLD: 0 % (ref 0–2)
BLOOD GROUP ANTIBODIES SERPL: NORMAL
DIFFERENTIAL METHOD BLD: ABNORMAL
EOSINOPHIL # BLD: 0.1 K/UL (ref 0–0.4)
EOSINOPHIL NFR BLD: 1 % (ref 0–5)
ERYTHROCYTE [DISTWIDTH] IN BLOOD BY AUTOMATED COUNT: 13.2 % (ref 11.6–14.5)
HCT VFR BLD AUTO: 32.5 % (ref 35–45)
HGB BLD-MCNC: 10.6 G/DL (ref 12–16)
LYMPHOCYTES # BLD: 3.1 K/UL (ref 0.9–3.6)
LYMPHOCYTES NFR BLD: 30 % (ref 21–52)
MCH RBC QN AUTO: 26.2 PG (ref 24–34)
MCHC RBC AUTO-ENTMCNC: 32.6 G/DL (ref 31–37)
MCV RBC AUTO: 80.4 FL (ref 74–97)
MONOCYTES # BLD: 1.1 K/UL (ref 0.05–1.2)
MONOCYTES NFR BLD: 11 % (ref 3–10)
NEUTS SEG # BLD: 6.1 K/UL (ref 1.8–8)
NEUTS SEG NFR BLD: 58 % (ref 40–73)
PLATELET # BLD AUTO: 227 K/UL (ref 135–420)
PMV BLD AUTO: 10 FL (ref 9.2–11.8)
RBC # BLD AUTO: 4.04 M/UL (ref 4.2–5.3)
SPECIMEN EXP DATE BLD: NORMAL
WBC # BLD AUTO: 10.4 K/UL (ref 4.6–13.2)

## 2020-04-27 PROCEDURE — 74011250637 HC RX REV CODE- 250/637: Performed by: OBSTETRICS & GYNECOLOGY

## 2020-04-27 PROCEDURE — 65270000029 HC RM PRIVATE

## 2020-04-27 PROCEDURE — 75410000000 HC DELIVERY VAGINAL/SINGLE

## 2020-04-27 PROCEDURE — 75410000002 HC LABOR FEE PER 1 HR

## 2020-04-27 PROCEDURE — 75410000003 HC RECOV DEL/VAG/CSECN EA 0.5 HR

## 2020-04-27 PROCEDURE — 59025 FETAL NON-STRESS TEST: CPT

## 2020-04-27 PROCEDURE — 85025 COMPLETE CBC W/AUTO DIFF WBC: CPT

## 2020-04-27 PROCEDURE — 86900 BLOOD TYPING SEROLOGIC ABO: CPT

## 2020-04-27 PROCEDURE — 74011000250 HC RX REV CODE- 250: Performed by: OBSTETRICS & GYNECOLOGY

## 2020-04-27 PROCEDURE — 74011250636 HC RX REV CODE- 250/636: Performed by: OBSTETRICS & GYNECOLOGY

## 2020-04-27 PROCEDURE — 77030040830 HC CATH URETH FOL MDII -A

## 2020-04-27 PROCEDURE — 10907ZC DRAINAGE OF AMNIOTIC FLUID, THERAPEUTIC FROM PRODUCTS OF CONCEPTION, VIA NATURAL OR ARTIFICIAL OPENING: ICD-10-PCS | Performed by: OBSTETRICS & GYNECOLOGY

## 2020-04-27 RX ORDER — NALBUPHINE HYDROCHLORIDE 10 MG/ML
5 INJECTION, SOLUTION INTRAMUSCULAR; INTRAVENOUS; SUBCUTANEOUS
Status: DISCONTINUED | OUTPATIENT
Start: 2020-04-27 | End: 2020-04-27

## 2020-04-27 RX ORDER — MINERAL OIL
30 OIL (ML) ORAL AS NEEDED
Status: DISCONTINUED | OUTPATIENT
Start: 2020-04-27 | End: 2020-04-27 | Stop reason: HOSPADM

## 2020-04-27 RX ORDER — PROMETHAZINE HYDROCHLORIDE 25 MG/ML
25 INJECTION, SOLUTION INTRAMUSCULAR; INTRAVENOUS
Status: DISCONTINUED | OUTPATIENT
Start: 2020-04-27 | End: 2020-04-29 | Stop reason: HOSPADM

## 2020-04-27 RX ORDER — LIDOCAINE HYDROCHLORIDE 10 MG/ML
20 INJECTION, SOLUTION EPIDURAL; INFILTRATION; INTRACAUDAL; PERINEURAL AS NEEDED
Status: COMPLETED | OUTPATIENT
Start: 2020-04-27 | End: 2020-04-27

## 2020-04-27 RX ORDER — PHENYLEPHRINE HCL IN 0.9% NACL 1 MG/10 ML
100 SYRINGE (ML) INTRAVENOUS AS NEEDED
Status: DISCONTINUED | OUTPATIENT
Start: 2020-04-27 | End: 2020-04-27

## 2020-04-27 RX ORDER — OXYTOCIN/0.9 % SODIUM CHLORIDE 20/1000 ML
999 PLASTIC BAG, INJECTION (ML) INTRAVENOUS ONCE
Status: COMPLETED | OUTPATIENT
Start: 2020-04-27 | End: 2020-04-27

## 2020-04-27 RX ORDER — HYDROMORPHONE HYDROCHLORIDE 1 MG/ML
1 INJECTION, SOLUTION INTRAMUSCULAR; INTRAVENOUS; SUBCUTANEOUS
Status: DISCONTINUED | OUTPATIENT
Start: 2020-04-27 | End: 2020-04-27 | Stop reason: HOSPADM

## 2020-04-27 RX ORDER — SODIUM CHLORIDE, SODIUM LACTATE, POTASSIUM CHLORIDE, CALCIUM CHLORIDE 600; 310; 30; 20 MG/100ML; MG/100ML; MG/100ML; MG/100ML
125 INJECTION, SOLUTION INTRAVENOUS CONTINUOUS
Status: DISCONTINUED | OUTPATIENT
Start: 2020-04-27 | End: 2020-04-27 | Stop reason: HOSPADM

## 2020-04-27 RX ORDER — AMOXICILLIN 250 MG
1 CAPSULE ORAL
Status: DISCONTINUED | OUTPATIENT
Start: 2020-04-27 | End: 2020-04-29 | Stop reason: HOSPADM

## 2020-04-27 RX ORDER — OXYTOCIN/0.9 % SODIUM CHLORIDE 30/500 ML
1-25 PLASTIC BAG, INJECTION (ML) INTRAVENOUS
Status: DISCONTINUED | OUTPATIENT
Start: 2020-04-27 | End: 2020-04-27 | Stop reason: HOSPADM

## 2020-04-27 RX ORDER — NALOXONE HYDROCHLORIDE 0.4 MG/ML
0.2 INJECTION, SOLUTION INTRAMUSCULAR; INTRAVENOUS; SUBCUTANEOUS AS NEEDED
Status: DISCONTINUED | OUTPATIENT
Start: 2020-04-27 | End: 2020-04-27

## 2020-04-27 RX ORDER — DIPHENHYDRAMINE HYDROCHLORIDE 50 MG/ML
25 INJECTION, SOLUTION INTRAMUSCULAR; INTRAVENOUS
Status: DISCONTINUED | OUTPATIENT
Start: 2020-04-27 | End: 2020-04-27

## 2020-04-27 RX ORDER — FENTANYL/ROPIVACAINE/NS/PF 2MCG/ML-.1
PLASTIC BAG, INJECTION (ML) EPIDURAL
Status: DISCONTINUED
Start: 2020-04-27 | End: 2020-04-27 | Stop reason: WASHOUT

## 2020-04-27 RX ORDER — TERBUTALINE SULFATE 1 MG/ML
0.25 INJECTION SUBCUTANEOUS
Status: DISCONTINUED | OUTPATIENT
Start: 2020-04-27 | End: 2020-04-27 | Stop reason: HOSPADM

## 2020-04-27 RX ORDER — FENTANYL CITRATE 50 UG/ML
100 INJECTION, SOLUTION INTRAMUSCULAR; INTRAVENOUS ONCE
Status: DISCONTINUED | OUTPATIENT
Start: 2020-04-27 | End: 2020-04-27

## 2020-04-27 RX ORDER — LANOLIN ALCOHOL/MO/W.PET/CERES
1 CREAM (GRAM) TOPICAL EVERY OTHER DAY
Status: DISCONTINUED | OUTPATIENT
Start: 2020-04-27 | End: 2020-04-29 | Stop reason: HOSPADM

## 2020-04-27 RX ORDER — IBUPROFEN 400 MG/1
800 TABLET ORAL
Status: DISCONTINUED | OUTPATIENT
Start: 2020-04-27 | End: 2020-04-29 | Stop reason: HOSPADM

## 2020-04-27 RX ORDER — ACETAMINOPHEN 325 MG/1
650 TABLET ORAL
Status: DISCONTINUED | OUTPATIENT
Start: 2020-04-27 | End: 2020-04-29 | Stop reason: HOSPADM

## 2020-04-27 RX ORDER — OXYTOCIN/0.9 % SODIUM CHLORIDE 20/1000 ML
125 PLASTIC BAG, INJECTION (ML) INTRAVENOUS CONTINUOUS
Status: DISCONTINUED | OUTPATIENT
Start: 2020-04-27 | End: 2020-04-27 | Stop reason: HOSPADM

## 2020-04-27 RX ORDER — ONDANSETRON 2 MG/ML
4 INJECTION INTRAMUSCULAR; INTRAVENOUS
Status: DISCONTINUED | OUTPATIENT
Start: 2020-04-27 | End: 2020-04-27 | Stop reason: HOSPADM

## 2020-04-27 RX ORDER — NALBUPHINE HYDROCHLORIDE 10 MG/ML
10 INJECTION, SOLUTION INTRAMUSCULAR; INTRAVENOUS; SUBCUTANEOUS
Status: DISCONTINUED | OUTPATIENT
Start: 2020-04-27 | End: 2020-04-27 | Stop reason: HOSPADM

## 2020-04-27 RX ORDER — BUTORPHANOL TARTRATE 2 MG/ML
2 INJECTION INTRAMUSCULAR; INTRAVENOUS
Status: DISCONTINUED | OUTPATIENT
Start: 2020-04-27 | End: 2020-04-27 | Stop reason: HOSPADM

## 2020-04-27 RX ORDER — FENTANYL CITRATE 50 UG/ML
INJECTION, SOLUTION INTRAMUSCULAR; INTRAVENOUS
Status: DISCONTINUED
Start: 2020-04-27 | End: 2020-04-27 | Stop reason: WASHOUT

## 2020-04-27 RX ORDER — FENTANYL/ROPIVACAINE/NS/PF 2MCG/ML-.1
1-15 PLASTIC BAG, INJECTION (ML) EPIDURAL
Status: DISCONTINUED | OUTPATIENT
Start: 2020-04-27 | End: 2020-04-27

## 2020-04-27 RX ORDER — ZOLPIDEM TARTRATE 5 MG/1
5 TABLET ORAL
Status: DISCONTINUED | OUTPATIENT
Start: 2020-04-27 | End: 2020-04-29 | Stop reason: HOSPADM

## 2020-04-27 RX ORDER — OXYCODONE AND ACETAMINOPHEN 5; 325 MG/1; MG/1
2 TABLET ORAL
Status: DISCONTINUED | OUTPATIENT
Start: 2020-04-27 | End: 2020-04-29 | Stop reason: HOSPADM

## 2020-04-27 RX ORDER — METHYLERGONOVINE MALEATE 0.2 MG/ML
0.2 INJECTION INTRAVENOUS AS NEEDED
Status: DISCONTINUED | OUTPATIENT
Start: 2020-04-27 | End: 2020-04-27 | Stop reason: HOSPADM

## 2020-04-27 RX ADMIN — BUTORPHANOL TARTRATE 2 MG: 2 INJECTION, SOLUTION INTRAMUSCULAR; INTRAVENOUS at 06:43

## 2020-04-27 RX ADMIN — ACETAMINOPHEN 650 MG: 325 TABLET ORAL at 15:30

## 2020-04-27 RX ADMIN — ACETAMINOPHEN 650 MG: 325 TABLET ORAL at 20:13

## 2020-04-27 RX ADMIN — Medication 19980 MILLI-UNITS/HR: at 09:36

## 2020-04-27 RX ADMIN — FERROUS SULFATE TAB 325 MG (65 MG ELEMENTAL FE) 325 MG: 325 (65 FE) TAB at 13:34

## 2020-04-27 RX ADMIN — SODIUM CHLORIDE, SODIUM LACTATE, POTASSIUM CHLORIDE, AND CALCIUM CHLORIDE 125 ML/HR: 600; 310; 30; 20 INJECTION, SOLUTION INTRAVENOUS at 05:11

## 2020-04-27 RX ADMIN — LIDOCAINE HYDROCHLORIDE 20 ML: 10 INJECTION, SOLUTION EPIDURAL; INFILTRATION; INTRACAUDAL; PERINEURAL at 09:39

## 2020-04-27 NOTE — H&P
History & Physical    Name: Elia Wilkes MRN: [id number]  SSN: [id number]    YOB: 1985  Age: 30 y.o. Sex: female        Subjective:     Estimated Date of Delivery: 20  OB History    Para Term  AB Living   5 1 1   3 1   SAB TAB Ectopic Molar Multiple Live Births   3                # Outcome Date GA Lbr Micah/2nd Weight Sex Delivery Anes PTL Lv   5 Current            4 2016           3 Term 2010   7 lb 12 oz (3.515 kg) F Vag-Spont      2 2007           1 06               Ms. Nelson Mendoza is admitted with pregnancy at 40w2d for Increasingly painful contractions. Prenatal course was complicated by obesity, anxiety. Please see prenatal records for details. Patient was due to induced today for post term pregnancy.     Past Medical History:   Diagnosis Date    Anxiety     Arthritic-like pain     knees    Essential hypertension     Gestational hypertension         Hypothyroidism     DONALD (iron deficiency anemia)     Irregular menses     PCOS (polycystic ovarian syndrome)     Prior pregnancy complicated by PIH, antepartum     during preg    Psychiatric problem     Snores     EVI (stress urinary incontinence, female)      Past Surgical History:   Procedure Laterality Date    DILATION AND CURETTAGE      X 2 both for miscarriage    GASTRIC BYPASS,OBESE<150CM CLINT-EN-Y      2016    HX GASTRIC BYPASS  2017    HX LAP CHOLECYSTECTOMY       Social History     Occupational History    Not on file   Tobacco Use    Smoking status: Never Smoker    Smokeless tobacco: Never Used   Substance and Sexual Activity    Alcohol use: Not Currently     Comment: monthly    Drug use: No    Sexual activity: Yes     Partners: Male     Family History   Problem Relation Age of Onset    Hypertension Mother     Depression Mother     Kidney Disease Father     Cancer Father     Hypertension Father     Diabetes Father        No Known Allergies  Prior to Admission medications    Medication Sig Start Date End Date Taking? Authorizing Provider   ferrous sulfate (IRON) 325 mg (65 mg iron) EC tablet Take 1 Tab by mouth two (2) times a day. 1/3/20  Yes Isela Howard NP   DPR22-Tprh Fumarate-FA-DSS-DHA 26-1.2- mg cap Take  by mouth. Yes Provider, Historical        Review of Systems: Pertinent items are noted in HPI. Objective:     Vitals:  Vitals:    04/27/20 0501 04/27/20 0700 04/27/20 0730 04/27/20 0800   BP: 130/76 127/64 125/75 101/64   Pulse: 86 64 70 68   Resp: 16  18    Temp: 98.2 °F (36.8 °C)  98.3 °F (36.8 °C)    Weight:       Height:            Physical Exam:  Patient with distress. Abdomen: soft, nontender, EFW 7 1/2 lbs. Cervical Exam: 7 cm dilated/90/-1  Membranes:  Artificial Rupture of Membranes; Amniotic Fluid: medium amount of clear fluid  Fetal Heart Rate: Baseline: 105 per minute    Prenatal Labs:   Lab Results   Component Value Date/Time    ABO/Rh(D) A POSITIVE 04/27/2020 05:00 AM    Rubella, External Immune 09/18/2019    GrBStrep, External Negative  04/01/2020    HBsAg, External Negative 09/18/2019    HIV, External Negative 09/18/2019    RPR, External Non-Reactive 09/18/2019    Gonorrhea, External Negative 09/18/2019    Chlamydia, External Negative 09/18/2019    ABO,Rh A positive 09/18/2019         Assessment/Plan:     Principal Problem:    Post term pregnancy over 40 weeks (4/26/2020)    Active Problems:    Anxiety          Plan: Admit for Reassuring fetal status, Continue plan for vaginal delivery. Group B Strep was negative.  Patient desires epidural.

## 2020-04-27 NOTE — PROGRESS NOTES
7475- Bedside verbal report received from EDGARDO Alanis RN and given to EDGARDO Tinoco RN/edgardo Chiu and all patient care assumed at this time. 7836- MD Shukla on unit. Pt update given. 200- MD Shukla at bedside. SVE and AROM performed. Awaiting anesthesia for epidural placement. 5820- MD Shukla notified of pt with the urge to push. MD to come to bedside. 3748- MD Shukla at bedside for delivery. 36-  of female . See delivery summary for details. 1200- pt transported via wheelchair accompanied by infant and spouse to postpartum unit. Pt in bed without complaints at this time. Bedside verbal report given to [facility] ARNOL LPN and all patient care turned over at this time.

## 2020-04-27 NOTE — PROGRESS NOTES
Problem: Patient Education: Go to Patient Education Activity  Goal: Patient/Family Education  Outcome: Progressing Towards Goal     Problem: Vaginal Delivery: Day of Deliver-Laboring  Goal: Off Pathway (Use only if patient is Off Pathway)  Outcome: Progressing Towards Goal  Goal: Activity/Safety  Outcome: Progressing Towards Goal  Goal: Consults, if ordered  Outcome: Progressing Towards Goal  Goal: Diagnostic Test/Procedures  Outcome: Progressing Towards Goal  Goal: Nutrition/Diet  Outcome: Progressing Towards Goal  Goal: Discharge Planning  Outcome: Progressing Towards Goal  Goal: Medications  Outcome: Progressing Towards Goal  Goal: Respiratory  Outcome: Progressing Towards Goal  Goal: Treatments/Interventions/Procedures  Outcome: Progressing Towards Goal  Goal: *Vital signs within defined limits  Outcome: Progressing Towards Goal  Goal: *Labs within defined limits  Outcome: Progressing Towards Goal  Goal: *Hemodynamically stable  Outcome: Progressing Towards Goal  Goal: *Optimal pain control at patient's stated goal  Outcome: Progressing Towards Goal     Problem: Vaginal Delivery: Day of Delivery-Post delivery  Goal: Off Pathway (Use only if patient is Off Pathway)  Outcome: Progressing Towards Goal  Goal: Activity/Safety  Outcome: Progressing Towards Goal  Goal: Consults, if ordered  Outcome: Progressing Towards Goal  Goal: Nutrition/Diet  Outcome: Progressing Towards Goal  Goal: Discharge Planning  Outcome: Progressing Towards Goal  Goal: Medications  Outcome: Progressing Towards Goal  Goal: Treatments/Interventions/Procedures  Outcome: Progressing Towards Goal  Goal: *Vital signs within defined limits  Outcome: Progressing Towards Goal  Goal: *Labs within defined limits  Outcome: Progressing Towards Goal  Goal: *Hemodynamically stable  Outcome: Progressing Towards Goal  Goal: *Optimal pain control at patient's stated goal  Outcome: Progressing Towards Goal  Goal: *Participates in infant care  Outcome: Progressing Towards Goal  Goal: *Demonstrates progressive activity  Outcome: Progressing Towards Goal  Goal: *Tolerating diet  Outcome: Progressing Towards Goal     Problem: Vaginal Delivery: Postpartum Day 1  Goal: Off Pathway (Use only if patient is Off Pathway)  Outcome: Progressing Towards Goal  Goal: Activity/Safety  Outcome: Progressing Towards Goal  Goal: Consults, if ordered  Outcome: Progressing Towards Goal  Goal: Diagnostic Test/Procedures  Outcome: Progressing Towards Goal  Goal: Nutrition/Diet  Outcome: Progressing Towards Goal  Goal: Discharge Planning  Outcome: Progressing Towards Goal  Goal: Medications  Outcome: Progressing Towards Goal  Goal: Treatments/Interventions/Procedures  Outcome: Progressing Towards Goal  Goal: Psychosocial  Outcome: Progressing Towards Goal  Goal: *Vital signs within defined limits  Outcome: Progressing Towards Goal  Goal: *Labs within defined limits  Outcome: Progressing Towards Goal  Goal: *Hemodynamically stable  Outcome: Progressing Towards Goal  Goal: *Optimal pain control at patient's stated goal  Outcome: Progressing Towards Goal  Goal: *Participates in infant care  Outcome: Progressing Towards Goal  Goal: *Demonstrates progressive activity  Outcome: Progressing Towards Goal  Goal: *Performs self perineal care  Outcome: Progressing Towards Goal  Goal: *Appropriate parent-infant bonding  Outcome: Progressing Towards Goal  Goal: *Tolerating diet  Outcome: Progressing Towards Goal  Goal: *Performs self breast care  Outcome: Progressing Towards Goal     Problem: Vaginal Delivery: Postpartum 2  Goal: Off Pathway (Use only if patient is Off Pathway)  Outcome: Progressing Towards Goal  Goal: Activity/Safety  Outcome: Progressing Towards Goal  Goal: Consults, if ordered  Outcome: Progressing Towards Goal  Goal: Nutrition/Diet  Outcome: Progressing Towards Goal  Goal: Discharge Planning  Outcome: Progressing Towards Goal  Goal: Medications  Outcome: Progressing Towards Goal  Goal: Treatments/Interventions/Procedures  Outcome: Progressing Towards Goal  Goal: Psychosocial  Outcome: Progressing Towards Goal     Problem: Vaginal Delivery: Discharge Outcomes  Goal: *Verbalizes name, dosage, time, side effects, and number of days to continue medications  Outcome: Progressing Towards Goal  Goal: *Describes available resources and support systems  Outcome: Progressing Towards Goal  Goal: *No signs and symptoms of infection  Outcome: Progressing Towards Goal  Goal: *Birth certificate information completed  Outcome: Progressing Towards Goal  Goal: *Received and verbalizes understanding of discharge plan and instructions  Outcome: Progressing Towards Goal  Goal: *Vital signs within defined limits  Outcome: Progressing Towards Goal  Goal: *Labs within defined limits  Outcome: Progressing Towards Goal  Goal: *Hemodynamically stable  Outcome: Progressing Towards Goal  Goal: *Optimal pain control at patient's stated goal  Outcome: Progressing Towards Goal  Goal: *Participates in infant care  Outcome: Progressing Towards Goal  Goal: *Demonstrates progressive activity  Outcome: Progressing Towards Goal  Goal: *Appropriate parent-infant bonding  Outcome: Progressing Towards Goal  Goal: *Tolerating diet  Outcome: Progressing Towards Goal     Problem: Pain  Goal: *Control of Pain  Outcome: Progressing Towards Goal     Problem: Patient Education: Go to Patient Education Activity  Goal: Patient/Family Education  Outcome: Progressing Towards Goal

## 2020-04-27 NOTE — PERIOP NOTES
At bedside for epidural placement but patient is complete and beginning to push. Anesthesia available if needed.

## 2020-04-27 NOTE — PROGRESS NOTES
0500:  Pt arrived to unit for scheduled induction. Consents signed, pt taken to room 4, assisted into gown, and connected to EFM. SVE 4/80/-2    8230: Spoke with Dr Segun escobar. Order to not start pit at this time, she will be in for rounds to see patient. 6312:  Pt c/o pain 07/10. Strip reactive at this time, stadol adminitered.

## 2020-04-27 NOTE — PROGRESS NOTES
TRANSFER - IN REPORT:    Verbal report received from GENNA Tinoco RN (name) on Albanian Barrera  being received from Arianna (unit) for routine progression of care      Report consisted of patients Situation, Background, Assessment and   Recommendations(SBAR). Information from the following report(s) SBAR, Intake/Output, MAR and Recent Results was reviewed with the receiving nurse. Opportunity for questions and clarification was provided. Assessment completed upon patients arrival to unit and care assumed. VSS. Oriented to room and unit. advised to [facility] for assistance to bathroom. Denies needs at this time. FOB at bedside. 1240-bonding with baby. Denies needs. 1334-resting in bed. Po ferrous sulfate given. Denies needs. 1440-awake in bed. Denies needs. 1530-pain med given. Breast feeding. 1610-fundus firm, lochia small. Pain med effective. Denies needs. 1835-assisted pt to bathroom. Voided 500 ml without diff. Carol-care instructions reviewed and completed by pt. Denies needs. 1910-Bedside and Verbal shift change report given to JOEY Rosado RN (oncoming nurse) by JOEY Hummel LPN (offgoing nurse). Report given with SBAR, Kardex, Intake/Output, MAR and Recent Results.

## 2020-04-27 NOTE — PROGRESS NOTES
0715- Bedside and Verbal shift change report given to GENNA TinocoRN/ GENNA KleinRN  (oncoming nurse) by GENNA Lee RN  (offgoing nurse). Report included the following information SBAR, Kardex and MAR. Patient resting in bed with IV infusing, TOCO and ultrasound on abdomin    0725- IV replaced, due to patient accidentally removed, patient tolerated well     0817- Update given to Dr. Rosa M Franco    0933- TOB, live female infant, apgars 8/9    1210-- TRANSFER - OUT REPORT:    Verbal report given to KAYLEEN Edmondson (name) on Glenis Washington  being transferred to Mother Baby (unit) for routine progression of care       Report consisted of patients Situation, Background, Assessment and   Recommendations(SBAR). Information from the following report(s) SBAR, Kardex and MAR was reviewed with the receiving nurse. Lines:   Peripheral IV 04/27/20 Left Wrist (Active)   Site Assessment Clean, dry, & intact 4/27/2020  7:30 AM   Phlebitis Assessment 0 4/27/2020  7:30 AM   Infiltration Assessment 0 4/27/2020  7:30 AM   Dressing Status Clean, dry, & intact 4/27/2020  7:30 AM   Dressing Type Tape;Transparent 4/27/2020  7:30 AM   Hub Color/Line Status Patent 4/27/2020  7:30 AM        Opportunity for questions and clarification was provided.       Patient transported with:   Registered Nurse

## 2020-04-28 PROBLEM — A59.01 TRICHOMONAL VAGINITIS DURING PREGNANCY IN FIRST TRIMESTER: Status: RESOLVED | Noted: 2019-09-18 | Resolved: 2020-04-28

## 2020-04-28 PROBLEM — O20.9 VAGINAL BLEEDING IN PREGNANCY, FIRST TRIMESTER: Status: RESOLVED | Noted: 2019-09-10 | Resolved: 2020-04-28

## 2020-04-28 PROBLEM — O35.EXX0 ENCOUNTER FOR ULTRASOUND RECHECK OF FETAL PYELECTASIS, ANTEPARTUM: Status: RESOLVED | Noted: 2020-01-30 | Resolved: 2020-04-28

## 2020-04-28 PROBLEM — O23.591 TRICHOMONAL VAGINITIS DURING PREGNANCY IN FIRST TRIMESTER: Status: RESOLVED | Noted: 2019-09-18 | Resolved: 2020-04-28

## 2020-04-28 LAB
HCT VFR BLD AUTO: 33.7 % (ref 35–45)
HGB BLD-MCNC: 10.7 G/DL (ref 12–16)

## 2020-04-28 PROCEDURE — 85018 HEMOGLOBIN: CPT

## 2020-04-28 PROCEDURE — 74011250637 HC RX REV CODE- 250/637: Performed by: OBSTETRICS & GYNECOLOGY

## 2020-04-28 PROCEDURE — 65270000029 HC RM PRIVATE

## 2020-04-28 PROCEDURE — 85014 HEMATOCRIT: CPT

## 2020-04-28 PROCEDURE — 36415 COLL VENOUS BLD VENIPUNCTURE: CPT

## 2020-04-28 PROCEDURE — 0HQ9XZZ REPAIR PERINEUM SKIN, EXTERNAL APPROACH: ICD-10-PCS | Performed by: OBSTETRICS & GYNECOLOGY

## 2020-04-28 RX ADMIN — ACETAMINOPHEN 650 MG: 325 TABLET ORAL at 14:00

## 2020-04-28 RX ADMIN — VITAMIN A, VITAMIN C, VITAMIN D-3, VITAMIN E, VITAMIN B-1, VITAMIN B-2, NIACIN, VITAMIN B-6, CALCIUM, IRON, ZINC, COPPER 1 TABLET: 4000; 120; 400; 22; 1.84; 3; 20; 10; 1; 12; 200; 27; 25; 2 TABLET ORAL at 09:31

## 2020-04-28 NOTE — PROGRESS NOTES
Bedside and Verbal shift change report given to JOEY Hummel LPN (oncoming nurse) by JOEY Rosado RN (offgoing nurse). Report included the following information SBAR, Kardex, Procedure Summary, Intake/Output, MAR, Accordion, Recent Results and Med Rec Status.

## 2020-04-28 NOTE — LACTATION NOTE
This note was copied from a baby's chart. 58 per mom,  has been cluster feeding. Encouraged mom to use lanolin & gel soothie to help with nipple soreness. Discussed ways to encourage a deeper latch to help with comfort. Encouraged mom to contact [facility] for assistance if needed. Breastfeeding discharge teaching completed to include feeding on demand, foremilk and hindmilk importance, engorgement, mastitis, clogged ducts, pumping, breastmilk storage, and returning to work. Information given about unit and office phone numbers and encouraged mom to reach out if concerns arise, but that [facility] would be calling her in the next few days to follow up on breastfeeding. Mom verbalized understanding and no questions at this time.

## 2020-04-28 NOTE — LACTATION NOTE
Per mom, infant latching and nursing well, but \"wants to nurse all the time. \" Discussed cluster feeding and non nutritive sucking vs nutritive sucking. Mom requesting infant be given pacifier. Educated on risks of early use of pacifiers in  baby and should not be used unless medically indicated--painful procedure and NICU admission. Educated that pacifiers are helpful in reducing SIDS risk, however should not be used until milk supply is established and breastfeeding is going well--around 1to 2 weeks old. Mom verbalized understanding, but wishes to give infant pacifier. Will page for feeds.

## 2020-04-28 NOTE — L&D DELIVERY NOTE
Delivery Summary    Patient: Drake Goodpasture MRN: [id number]  SSN: [id number]    YOB: 1985  Age: 30 y.o. Sex: female       Information for the patient's :  Era Kendrick [[id number]]       Labor Events:    Labor: No    Steroids: None   Cervical Ripening Date/Time:       Cervical Ripening Type: None   Antibiotics During Labor: No   Rupture Identifier:      Rupture Date/Time: 2020 8:42 AM   Rupture Type: AROM   Amniotic Fluid Volume: Moderate    Amniotic Fluid Description: Clear    Amniotic Fluid Odor:      Induction: AROM       Induction Date/Time:        Indications for Induction:      Augmentation: None   Augmentation Date/Time:      Indications for Augmentation:     Labor complications: None       Additional complications:        Delivery Events:  Indications For Episiotomy:     Episiotomy: None   Perineal Laceration(s): 1st   Repaired: Yes   Periurethral Laceration Location:      Repaired:     Labial Laceration Location:     Repaired:     Sulcal Laceration Location:     Repaired:     Vaginal Laceration Location:     Repaired:     Cervical Laceration Location:     Repaired:     Repair Suture: Vicryl 2-0   Number of Repair Packets: 1   Estimated Blood Loss (ml): 350ml   Quantitative Blood Loss (ml)                Delivery Date: 2020    Delivery Time: 9:33 AM  Delivery Type: Vaginal, Spontaneous  Sex:  Female    Gestational Age: 41w4d   Delivery Clinician:  Darrin Horner  Living Status: Living   Delivery Location: L&D            APGARS  One minute Five minutes Ten minutes   Skin color: 1   1        Heart rate: 2   2        Grimace: 2   2        Muscle tone: 2   2        Breathin   2        Totals: 9   9            Presentation: Vertex    Position: Left Occiput Anterior  Resuscitation Method:  None     Meconium Stained: None      Cord Information: 3 Vessels  Complications: None  Cord around:    Delayed cord clamping?  Yes  Cord clamped date/time:   Disposition of Cord Blood: Lab    Blood Gases Sent?: No    Placenta:  Date/Time: 2020  9:37 AM  Removal: Spontaneous      Appearance: Normal      Measurements:  Birth Weight: 7 lb 10.2 oz (3.465 kg)      Birth Length: 1' 9\" (0.533 m)      Head Circumference: 1' 1.39\" (0.34 m)      Chest Circumference: 1' 0.99\" (0.33 m)     Abdominal Girth: 1' 0.6\" (0.32 m)    Other Providers:   YAHAIRA Eduardo;BHARATH GATES;Kim KIM Jens Rushing, Obstetrician;Primary Nurse;Primary  Nurse;Nurse Practitioner;Staff Nurse           Group B Strep:   Lab Results   Component Value Date/Time    GrBStrep, External Negative  2020     Information for the patient's :  Nilton To [[id number]]   No results found for: ABORH, PCTABR, PCTDIG, BILI, ABORHEXT, ABORH    No results for input(s): PCO2CB, PO2CB, HCO3I, SO2I, IBD, PTEMPI, SPECTI, PHICB, ISITE, IDEV, IALLEN in the last 72 hours.

## 2020-04-28 NOTE — PROGRESS NOTES
Post-Partum Day Number 1 Progress Note    Liyahbrent Washington     Assessment:   Hospital Problems  Date Reviewed: 2020          Codes Class Noted POA    Spontaneous vaginal delivery ICD-10-CM: O80  ICD-9-CM: 024  2020 Unknown        Perineal laceration, first degree, delivered ICD-10-CM: O70.0  ICD-9-CM: 664.01  2020 No        * (Principal) Post term pregnancy over 40 weeks ICD-10-CM: O48.0  ICD-9-CM: 645.10  2020 Yes        Anxiety ICD-10-CM: F41.9  ICD-9-CM: 300.00  Unknown Yes            Doing well, post partum day 1    Plan:  1. Continue routine postpartum and perineal care as well as maternal education. 2. Plan for discharge tomorrow. Information for the patient's :  Cloyce Ing [[id number]]   Vaginal, Spontaneous   Patient doing well without significant complaint. Voiding without difficulty, normal lochia. Patient is breast feeding. Baby is under bili lights due to jaundice. Current Facility-Administered Medications   Medication Dose Route Frequency    ferrous sulfate tablet 325 mg  1 Tab Oral EVERY OTHER DAY    prenatal vit-calcium-iron-fa (PRENATAL PLUS with CALCIUM) tablet 1 Tab  1 Tab Oral DAILY       Vitals:  Visit Vitals  /60 (BP 1 Location: Left arm, BP Patient Position: At rest)   Pulse 64   Temp 98 °F (36.7 °C)   Resp 17   Ht 5' 8\" (1.727 m)   Wt 222 lb (100.7 kg)   LMP 2019 (Approximate)   SpO2 100%   Breastfeeding Unknown   BMI 33.75 kg/m²     Temp (24hrs), Av.3 °F (36.8 °C), Min:98 °F (36.7 °C), Max:98.7 °F (37.1 °C)        Exam:   Patient without distress. Abdomen soft, fundus firm, nontender                Perineum with normal lochia noted. Lower extremities are negative for swelling, cords or tenderness.     Labs:     Lab Results   Component Value Date/Time    WBC 10.4 2020 05:00 AM    WBC 7.9 2019 04:29 PM    WBC 5.3 2019 11:32 AM    HGB 10.7 (L) 2020 03:55 AM    HGB 10.6 (L) 04/27/2020 05:00 AM    HGB 10.9 (L) 04/07/2020 10:24 AM    HCT 33.7 (L) 04/28/2020 03:55 AM    HCT 32.5 (L) 04/27/2020 05:00 AM    HCT 38.6 09/16/2019 04:29 PM    PLATELET 237 53/45/8982 05:00 AM    PLATELET 522 53/99/0271 04:29 PM    PLATELET 243 62/62/7717 11:32 AM       Recent Results (from the past 24 hour(s))   HEMOGLOBIN    Collection Time: 04/28/20  3:55 AM   Result Value Ref Range    HGB 10.7 (L) 12.0 - 16.0 g/dL   HEMATOCRIT    Collection Time: 04/28/20  3:55 AM   Result Value Ref Range    HCT 33.7 (L) 35.0 - 45.0 %

## 2020-04-28 NOTE — PROGRESS NOTES
Assumed care of pt.  0820-eating breakfast. Denies needs. 0931-assessment completed. INT d/c'd. Denies needs. 1115-awake in bed. Denies needs. 1220-attempting to breast feed. 1325-breast feeding. 1443-VSS. Pain med effective. Fundus firm, lohia small. Pt wants to rest now. Sign put on door. Denies needs. 1635-sitting in recliner. Denies needs. 1820-resting in bed. Denies needs. 1915-Bedside and Verbal shift change report given to S. Randene Soulier RN  (oncoming nurse) by JOEY Hummel LPN (offgoing nurse). Report given with SBAR, Kardex, Intake/Output, MAR and Recent Results.

## 2020-04-29 VITALS
DIASTOLIC BLOOD PRESSURE: 80 MMHG | BODY MASS INDEX: 33.65 KG/M2 | RESPIRATION RATE: 18 BRPM | SYSTOLIC BLOOD PRESSURE: 126 MMHG | WEIGHT: 222 LBS | HEIGHT: 68 IN | HEART RATE: 79 BPM | TEMPERATURE: 98 F | OXYGEN SATURATION: 100 %

## 2020-04-29 PROBLEM — O48.0 POST TERM PREGNANCY OVER 40 WEEKS: Status: RESOLVED | Noted: 2020-04-26 | Resolved: 2020-04-29

## 2020-04-29 PROCEDURE — 74011250637 HC RX REV CODE- 250/637: Performed by: OBSTETRICS & GYNECOLOGY

## 2020-04-29 RX ADMIN — VITAMIN A, VITAMIN C, VITAMIN D-3, VITAMIN E, VITAMIN B-1, VITAMIN B-2, NIACIN, VITAMIN B-6, CALCIUM, IRON, ZINC, COPPER 1 TABLET: 4000; 120; 400; 22; 1.84; 3; 20; 10; 1; 12; 200; 27; 25; 2 TABLET ORAL at 08:17

## 2020-04-29 NOTE — PROGRESS NOTES
Problem: Patient Education: Go to Patient Education Activity  Goal: Patient/Family Education  Outcome: Resolved/Met     Problem: Vaginal Delivery: Postpartum Day 1  Goal: Off Pathway (Use only if patient is Off Pathway)  Outcome: Resolved/Met  Goal: Activity/Safety  Outcome: Resolved/Met  Goal: Consults, if ordered  Outcome: Resolved/Met  Goal: Diagnostic Test/Procedures  Outcome: Resolved/Met  Goal: Nutrition/Diet  Outcome: Resolved/Met  Goal: Discharge Planning  Outcome: Resolved/Met  Goal: Medications  Outcome: Resolved/Met  Goal: Treatments/Interventions/Procedures  Outcome: Resolved/Met  Goal: Psychosocial  Outcome: Resolved/Met  Goal: *Vital signs within defined limits  Outcome: Resolved/Met  Goal: *Labs within defined limits  Outcome: Resolved/Met  Goal: *Hemodynamically stable  Outcome: Resolved/Met  Goal: *Optimal pain control at patient's stated goal  Outcome: Resolved/Met  Goal: *Participates in infant care  Outcome: Resolved/Met  Goal: *Demonstrates progressive activity  Outcome: Resolved/Met  Goal: *Performs self perineal care  Outcome: Resolved/Met  Goal: *Appropriate parent-infant bonding  Outcome: Resolved/Met  Goal: *Tolerating diet  Outcome: Resolved/Met  Goal: *Performs self breast care  Outcome: Resolved/Met     Problem: Vaginal Delivery: Postpartum 2  Goal: Off Pathway (Use only if patient is Off Pathway)  Outcome: Resolved/Met  Goal: Activity/Safety  Outcome: Resolved/Met  Goal: Consults, if ordered  Outcome: Resolved/Met  Goal: Nutrition/Diet  Outcome: Resolved/Met  Goal: Discharge Planning  Outcome: Resolved/Met  Goal: Medications  Outcome: Resolved/Met  Goal: Treatments/Interventions/Procedures  Outcome: Resolved/Met  Goal: Psychosocial  Outcome: Resolved/Met     Problem: Vaginal Delivery: Discharge Outcomes  Goal: *Verbalizes name, dosage, time, side effects, and number of days to continue medications  Outcome: Resolved/Met  Goal: *Describes available resources and support systems  Outcome: Resolved/Met  Goal: *No signs and symptoms of infection  Outcome: Resolved/Met  Goal: *Birth certificate information completed  Outcome: Resolved/Met  Goal: *Received and verbalizes understanding of discharge plan and instructions  Outcome: Resolved/Met  Goal: *Vital signs within defined limits  Outcome: Resolved/Met  Goal: *Labs within defined limits  Outcome: Resolved/Met  Goal: *Hemodynamically stable  Outcome: Resolved/Met  Goal: *Optimal pain control at patient's stated goal  Outcome: Resolved/Met  Goal: *Participates in infant care  Outcome: Resolved/Met  Goal: *Demonstrates progressive activity  Outcome: Resolved/Met  Goal: *Appropriate parent-infant bonding  Outcome: Resolved/Met  Goal: *Tolerating diet  Outcome: Resolved/Met     Problem: Pain  Goal: *Control of Pain  Outcome: Resolved/Met     Problem: Patient Education: Go to Patient Education Activity  Goal: Patient/Family Education  Outcome: Resolved/Met     Problem: Falls - Risk of  Goal: *Absence of Falls  Description: Document Roya Fall Risk and appropriate interventions in the flowsheet.   Outcome: Resolved/Met     Problem: Patient Education: Go to Patient Education Activity  Goal: Patient/Family Education  Outcome: Resolved/Met

## 2020-04-29 NOTE — LACTATION NOTE
Per mom, infant latching and nursing well, but nipples are still sore. More gel soothies given to patient. Breastfeeding discharge teaching completed to include feeding on demand, foremilk and hindmilk importance, engorgement, mastitis, clogged ducts, pumping, breastmilk storage, and returning to work. Information given about unit and office phone numbers and encouraged mom to reach out if concerns arise, but that [facility] would be calling her in the next few days to follow up on breastfeeding. Mom verbalized understanding and no questions at this time.

## 2020-04-29 NOTE — PROGRESS NOTES
0720 - Bedside and Verbal shift change report given to ADA Sprague RN (oncoming nurse) by BLANCA Rios RN (offgoing nurse). Report included the following information SBAR, Procedure Summary, Intake/Output and MAR.     0815 - Full assessment done on mom and baby. Discharge orders in for today. Prenatal vitamin given to mom, per MAR. Asked mom about pediatrician follow up. Mom to call to make appt asap. **Discharge teaching done by PRANAY Rivera, RN     3269 - Mom and baby left hospital via wheelchair.

## 2020-04-29 NOTE — PROGRESS NOTES
Post-Partum Day Number 2 Progress Note    Drake Goodpasture     Assessment:   Hospital Problems  Date Reviewed: 2020          Codes Class Noted POA    Spontaneous vaginal delivery ICD-10-CM: O80  ICD-9-CM: 084  2020 No        Perineal laceration, first degree, delivered ICD-10-CM: O70.0  ICD-9-CM: 664.01  2020 No        * (Principal) Post term pregnancy over 40 weeks ICD-10-CM: O48.0  ICD-9-CM: 645.10  2020 Yes        Anxiety ICD-10-CM: F41.9  ICD-9-CM: 300.00  Unknown Yes            Doing well, post partum day 2    Plan:   1. Discharge home today  2. Follow up in office in 6 weeks with Marisa Pisano MD   3. Post partum activity advised, diet as tolerated  4. Discharge Medications:  medications prior to admission    Information for the patient's :  Era Kendrick [[id number]]   Vaginal, Spontaneous   Patient doing well without significant complaint. Voiding without difficulty, normal lochia. Patient states baby is no longer under bili lights. Current Facility-Administered Medications   Medication Dose Route Frequency    ferrous sulfate tablet 325 mg  1 Tab Oral EVERY OTHER DAY    prenatal vit-calcium-iron-fa (PRENATAL PLUS with CALCIUM) tablet 1 Tab  1 Tab Oral DAILY       Vitals:  Visit Vitals  /80 (BP 1 Location: Left arm, BP Patient Position: At rest)   Pulse 79   Temp 98 °F (36.7 °C)   Resp 18   Ht 5' 8\" (1.727 m)   Wt 222 lb (100.7 kg)   LMP 2019 (Approximate)   SpO2 100%   Breastfeeding Unknown   BMI 33.75 kg/m²     Temp (24hrs), Av.4 °F (36.9 °C), Min:98 °F (36.7 °C), Max:98.7 °F (37.1 °C)      Exam:         Patient without distress. Abdomen soft, fundus firm, nontender                 Lower extremities are negative for swelling, cords or tenderness.     Labs:     Lab Results   Component Value Date/Time    WBC 10.4 2020 05:00 AM    WBC 7.9 2019 04:29 PM    WBC 5.3 2019 11:32 AM    HGB 10.7 (L) 2020 03:55 AM    HGB 10.6 (L) 04/27/2020 05:00 AM    HGB 10.9 (L) 04/07/2020 10:24 AM    HCT 33.7 (L) 04/28/2020 03:55 AM    HCT 32.5 (L) 04/27/2020 05:00 AM    HCT 38.6 09/16/2019 04:29 PM    PLATELET 337 21/22/8301 05:00 AM    PLATELET 302 13/50/0002 04:29 PM    PLATELET 992 66/60/9836 11:32 AM       No results found for this or any previous visit (from the past 24 hour(s)).

## 2020-04-29 NOTE — PROGRESS NOTES
Bedside and Verbal shift change report given to ADA Sprague RN by Nancy Méndez RN. Report included the following information SBAR, Kardex, OR Summary, Intake/Output and MAR.

## 2020-04-29 NOTE — DISCHARGE INSTRUCTIONS
POST DELIVERY DISCHARGE INSTRUCTIONS    Name: Jesenia Morris  YOB: 1985  Primary Diagnosis: Active Problems:    Spontaneous vaginal delivery (2020)      Perineal laceration, first degree, delivered (2020)      Call your obstetricians office as soon as possible to schedule an appointment for a 6 weeks postpartum check. General:     Diet/Diet Restrictions:  Eight 8-ounce glasses of fluid daily (water, juices); avoid excessive caffeine intake. Meals/snacks as desired which are high in fiber and carbohydrates and low in fat and cholesterol. Physical Activity / Restrictions / Safety:     Avoid heavy lifting, no more that 8 lbs. For 2-3 weeks; limit use of stairs to 2 times daily for the first week home. No driving for one week. Avoid intercourse 4-6 weeks, no douching or tampon use. Check with obstetrician before starting or resuming an exercise program.         Discharge Instructions/Special Treatment/Home Care Needs:     Continue prenatal vitamins. Continue to use squirt bottle with warm water on your episiotomy after each bathroom use until bleeding stops. If steri-strips applied to your incision, remove in 7-10 days. Call your doctor for the following:     Fever over 101 degrees by mouth. Vaginal bleeding heavier than a normal menstrual period or clot larger than a golf ball. Red streaks or increased swelling of legs, painful red streaks on your breast.  Painful urination, constipation and increased pain or swelling or discharge with your incision. If you feel extremely anxious or overwhelmed. If you have thoughts of harming yourself and/or your baby. Pain Management:     Pain Management:   Take Acetaminophen (Tylenol) or Ibuprofen (Advil, Motrin), as directed for pain. Use a warm Sitz bath 3 times daily to relieve episiotomy or hemorrhoidal discomfort. Heating pad to  incision as needed.  For hemorrhoidal discomfort, use Tucks and Anusol cream as needed and directed. Follow-Up Care: These are general instructions for a healthy lifestyle:    No smoking/ No tobacco products/ Avoid exposure to second hand smoke    Surgeon General's Warning:  Quitting smoking now greatly reduces serious risk to your health. Obesity, smoking, and sedentary lifestyle greatly increases your risk for illness    A healthy diet, regular physical exercise & weight monitoring are important for maintaining a healthy lifestyle    Recognize signs and symptoms of STROKE:    F-face looks uneven    A-arms unable to move or move unevenly    S-speech slurred or non-existent    T-time-call 911 as soon as signs and symptoms begin-DO NOT go       Back to bed or wait to see if you get better-TIME IS BRAIN.     Patient armband removed and shredded

## 2020-04-29 NOTE — PROGRESS NOTES
Problem: Vaginal Delivery: Postpartum 2  Goal: Activity/Safety  Outcome: Progressing Towards Goal  Goal: Nutrition/Diet  Outcome: Progressing Towards Goal  Goal: Medications  Outcome: Progressing Towards Goal  Goal: Treatments/Interventions/Procedures  Outcome: Progressing Towards Goal  Goal: Psychosocial  Outcome: Progressing Towards Goal     Problem: Pain  Goal: *Control of Pain  Outcome: Progressing Towards Goal

## 2020-04-29 NOTE — DISCHARGE SUMMARY
Obstetrical Discharge Summary     Name: Radha Darnell MRN: [id number]  SSN: [id number]    YOB: 1985  Age: 30 y.o. Sex: female      Admit Date: 2020    Discharge Date: 2020    Admitting Physician: Baudilio Wylie MD     Attending Physician:  Tess Sousa MD     Discharge Diagnoses:   Spontaneous vaginal delivery female infant, perinal laceration first degree  Information for the patient's :  Fabby Romero [[id number]]   Delivery of a 7 lb 10.2 oz (3.465 kg) female infant via Vaginal, Spontaneous on 2020 at 9:33 AM  by Lisa Roldan. Apgars were 9  and 9 . Additional Diagnoses:   Problem List as of 2020 Date Reviewed: 2020          Codes Class Noted - Resolved    Spontaneous vaginal delivery ICD-10-CM: O80  ICD-9-CM: 650  2020 - Present        Perineal laceration, first degree, delivered ICD-10-CM: O70.0  ICD-9-CM: 664.01  2020 - Present        Low serum vitamin D ICD-10-CM: R79.89  ICD-9-CM: 790.6  2019 - Present    Overview Signed 2019  7:33 AM by Kamryn Vieira NP     18.9 on 19 - she was treated by PCP. Will recheck vitamin D level at next visit.               Prior pregnancy complicated by [facility], antepartum ICD-10-CM: O09.899  ICD-9-CM: V23.49  Unknown - Present    Overview Addendum 3/16/2020  5:19 PM by ALEXIS Faulkner     during preg  EFW 83% at morph  Female fetus  3/16/2020-  EFW 5lb 12oz; 70%             Hypovitaminosis D ICD-10-CM: E55.9  ICD-9-CM: 268.9  2019 - Present        Folate deficiency ICD-10-CM: E53.8  ICD-9-CM: 266.2  3/22/2018 - Present        B12 deficiency ICD-10-CM: E53.8  ICD-9-CM: 266.2  3/22/2018 - Present        S/P bariatric surgery ICD-10-CM: Z98.84  ICD-9-CM: V45.86  2/15/2017 - Present    Overview Addendum 2019  2:55 PM by Tess Sousa MD     17 - Lap sleeve - [place]             Intestinal malabsorption ICD-10-CM: K90.9  ICD-9-CM: 579.9  2/15/2017 - Present EVI (stress urinary incontinence, female) ICD-10-CM: N39.3  ICD-9-CM: 625.6  Unknown - Present        Snores ICD-10-CM: R06.83  ICD-9-CM: 786.09  Unknown - Present        Arthritic-like pain ICD-10-CM: M25.50  ICD-9-CM: 719.40  Unknown - Present    Overview Signed 1/9/2017  2:46 PM by ALEXIS Mcallister     knees             * (Principal) RESOLVED: Post term pregnancy over 40 weeks ICD-10-CM: O48.0  ICD-9-CM: 645.10  4/26/2020 - 4/29/2020        RESOLVED: Encounter for ultrasound recheck of fetal pyelectasis, antepartum ICD-10-CM: O35. 8XX0  ICD-9-CM: V28.3, 655.83  1/30/2020 - 4/28/2020    Overview Addendum 3/16/2020  5:19 PM by ALEXIS Arias     1/31/2020 2lb 13oz 72%, right fetal kidney 7 mm ap. Repeat at 34 weeks. 3/16/2020-  EFW 5lb 12oz; 70%. Pyelectasis resolved. RESOLVED: Trichomonal vaginitis during pregnancy in first trimester ICD-10-CM: O23.591, A59.01  ICD-9-CM: 726.07, 131.01  9/18/2019 - 4/28/2020    Overview Addendum 4/3/2020 12:06 PM by ALEXIS Arias     ANABEL in 1-2 months, negative  ANABEL in third trimester, negative on 4/1/2020  MFM referral sent-also sent for medication exposure on 10/14-appt 11/13/19 @1000. Pt aware. Patient did not keep appt and declines to make a new appt. She is no longer using Celexa.              RESOLVED: Subchorionic hematoma in first trimester ICD-10-CM: O41.8X10, O46.8X1  ICD-9-CM: 656.73  9/17/2019 - 10/1/2019    Overview Signed 9/17/2019  8:49 AM by Ibis Godfrey NP     Dx with 31x7mm subchorionic bleed during formal ultrasound 9/9/19             RESOLVED: Vaginal bleeding in pregnancy, first trimester ICD-10-CM: O20.9  ICD-9-CM: 640.93  9/10/2019 - 4/28/2020    Overview Addendum 10/1/2019  5:01 PM by Malini Barth MD     Subchorionic hemorrhage, resolved 9/30/19             RESOLVED: Morbid obesity with body mass index of 50.0-59.9 in adult [facility]) ICD-10-CM: E66.01, Z68.43  ICD-9-CM: 278.01, V85.43  1/26/2017 - 3/22/2018 RESOLVED: Hypertension ICD-10-CM: I10  ICD-9-CM: 401.9  Unknown - 9/17/2019    Overview Signed 1/23/2017  3:04 PM by ALEXIS Kumar     during preg             RESOLVED: Morbid obesity with BMI of 50.0-59.9, adult ([facility] ICD-10-CM: E66.01, Z68.43  ICD-9-CM: 278.01, V85.43  Unknown - 3/22/2018        RESOLVED: Morbid obesity ([facility] [address] [facility] ICD-10-CM: E66.01  ICD-9-CM: 278.01  Unknown - 3/22/2018        RESOLVED: Hypothyroidism ICD-10-CM: E03.9  ICD-9-CM: 244.9  Unknown - 3/22/2018        RESOLVED: Anxiety ICD-10-CM: F41.9  ICD-9-CM: 300.00  Unknown - 4/29/2020              Lab Results   Component Value Date/Time    Rubella, External Immune 09/18/2019    GrBStrep, External Negative  04/01/2020     Recent Labs     04/28/20  0355   HGB 10.7[facility] Hospital Course: Normal hospital course following the delivery. Patient Instructions:   Current Discharge Medication List      CONTINUE these medications which have NOT CHANGED    Details   ferrous sulfate (IRON) 325 mg (65 mg iron) EC tablet Take 1 Tab by mouth two (2) times a day. Qty: 90 Tab, Refills: 1    Associated Diagnoses: Anemia affecting pregnancy in second trimester      PNV66-Iron Fumarate-FA-DSS-DHA 26-1.2- mg cap Take  by mouth. Reference my discharge instructions.     Follow-up Appointments   Procedures    FOLLOW UP VISIT Appointment in: 6 Weeks     Standing Status:   Standing     Number of Occurrences:   1     Order Specific Question:   Appointment in     Answer:   6 Weeks        Signed By:  Toy Patiño MD     April 29, 2020                       BST

## 2020-12-08 ENCOUNTER — DOCUMENTATION ONLY (OUTPATIENT)
Dept: SURGERY | Age: 35
End: 2020-12-08

## 2020-12-08 NOTE — PROGRESS NOTES
Per [facility] requirements;  E-mail and letter sent for follow up appointment. [facility]ist  [address] [place], [address]                 [facility] Weight Loss [place]  [facility]  [facility]      Dear Patient,    Your health is our main concern. It is important for your health to have follow-up lab work and to see your surgeon at 2 months, 4 months, 6 months, 9 months and annually after your weight loss surgery. Additionally, the Department of Bariatric Surgery at our hospital is a member of the [employer] of [address] [place] [address] Surgical Quality Improvement Program ([facility] NSQIP). As a participant in this program, we gather information on the outcomes of our patients after surgery. Please call the office for a follow up appointment at [phone]. If you have moved out of the area or have changed surgeons please call us and let us know the name of your doctor. Your health and feedback are important to us. We greatly appreciate your response.        Thank you,  [facility] [place] [facility] [address] [facility]

## (undated) DEVICE — MEDI-VAC NON-CONDUCTIVE SUCTION TUBING: Brand: CARDINAL HEALTH

## (undated) DEVICE — DISPOSABLE SUCTION/IRRIGATOR TUBE SET WITH TIP: Brand: AHTO

## (undated) DEVICE — THIS PRODUCT IS SINGLE USE AND INTENDED TO BE USED FOR BLUNT DISSECTION OF TISSUE.: Brand: ASPEN® ENDOSCOPIC KITTNER, SINGLE TIP

## (undated) DEVICE — 3L THIN WALL CAN: Brand: CRD

## (undated) DEVICE — ENDO CARRY-ON PROCEDURE KIT INCLUDES ENZYMATIC SPONGE, GAUZE, BIOHAZARD LABEL, TRAY, LUBRICANT, DIRTY SCOPE LABEL, WATER LABEL, TRAY, DRAWSTRING PAD, AND DEFENDO 4-PIECE KIT.: Brand: ENDO CARRY-ON PROCEDURE KIT

## (undated) DEVICE — TROCAR ENDOSCP BLDELSS 12X100 MM W/ HNDL STBL SL OPT TIP

## (undated) DEVICE — CLIP SUT ENDOSCP F/2-0/3-0/4-0 -- LAPRA-TY

## (undated) DEVICE — STAPLER INT L37CM STPL 21MM CIR ENDOSCP CRV INTLUMN B FRM

## (undated) DEVICE — AGENT HEMSTAT W4XL4IN OXIDIZED REGENERATED CELOS STRUCTURED

## (undated) DEVICE — STERILE POLYISOPRENE POWDER-FREE SURGICAL GLOVES: Brand: PROTEXIS

## (undated) DEVICE — DEVON™ KNEE AND BODY STRAP 60" X 3" (1.5 M X 7.6 CM): Brand: DEVON

## (undated) DEVICE — PREP SKN PREVAIL 40ML APPL --

## (undated) DEVICE — TIP APPL L35CM RIG FOR SEAL EVICEL

## (undated) DEVICE — CUTTER ENDOSCP L340MM LIN ARTC SGL STROKE FIRING ENDOPATH

## (undated) DEVICE — DRAIN SURG 15FR L3/16IN SIL RND 3/4 FLUT 3/16IN TRCR

## (undated) DEVICE — SYR IRR CATH TIP LR ADPT 70ML -- CONVERT TO ITEM 363120

## (undated) DEVICE — RELOAD STPL L60MM H1-2.6MM MESENTERY THN TISS WHT 6 ROW

## (undated) DEVICE — Device

## (undated) DEVICE — AGENT HEMSTAT W6XL9IN OXIDIZED REGENERATED CELOS ABSRB FOR

## (undated) DEVICE — TROCAR ENDOSCP L100MM DIA15MM BLDELSS STBL SL ENDOPATH XCEL

## (undated) DEVICE — SUTURE ETHLN SZ 3-0 L30IN NONABSORBABLE BLK FSL L30MM 3/8 1671H

## (undated) DEVICE — SUTURE ETHIB EXCL BR GRN TAPR PT 2-0 30 X563H X563H

## (undated) DEVICE — DRAPE TWL SURG 16X26IN BLU ORB04] ALLCARE INC]

## (undated) DEVICE — SUTURE PDS II SZ 0 L27IN ABSRB VLT L26MM CT-2 1/2 CIR Z334H

## (undated) DEVICE — APPLIER CLP L SHFT DIA12MM 20 ROT MULT LIGACLP

## (undated) DEVICE — NDL PRT INJ NSAF BLNT 18GX1.5 --

## (undated) DEVICE — STAPLER SKIN L440MM 32MM LNG 12 FIRING B FRM PWR + GRIPPING

## (undated) DEVICE — SEALANT HEMSTAT 5ML HUM FIBRIN THROM 2 VI APPL DEV EVICEL

## (undated) DEVICE — ENDOCUT SCISSOR TIP, DISPOSABLE: Brand: RENEW

## (undated) DEVICE — TISSUE RETRIEVAL SYSTEM: Brand: INZII RETRIEVAL SYSTEM

## (undated) DEVICE — SOL IRRIGATION INJ NACL 0.9% 500ML BTL

## (undated) DEVICE — TROCAR ENDOSCP L100MM DIA12MM STBL SL BLDELSS ENDOPATH XCEL

## (undated) DEVICE — SUT MONOCRYL PLUS UD 4-0 --

## (undated) DEVICE — AMD ANTIMICROBIAL DRAIN SPONGES, 6 PLY, 0.2% POLYHEXAMETHYLENE BIGUANIDE HCI (PHMB): Brand: EXCILON

## (undated) DEVICE — 3M™ STERI-STRIP™ REINFORCED ADHESIVE SKIN CLOSURES, R1548, 1 IN X 5 IN (25 MM X 125 MM), 4 STRIPS/ENVELOPE: Brand: 3M™ STERI-STRIP™

## (undated) DEVICE — RELOAD STPL L60MM H1.5-3.6MM REG TISS BLU GRIPPING SURF B

## (undated) DEVICE — SUT PROL 2-0 30IN CT2 BLU --

## (undated) DEVICE — SYR 3ML LL TIP 1/10ML GRAD --

## (undated) DEVICE — (D)PACK STD BSHR BARIATRIC -- DISC BY MFR USE ITEM 338832

## (undated) DEVICE — RELOAD STPL H1-2.5X45MM VASC THN TISS WHT 6 ROW B FRM SGL

## (undated) DEVICE — SOLUTION LACTATED RINGERS INJECTION USP

## (undated) DEVICE — TRAY CATH OD16FR SIL URIN M STATLOK STBL DEV SURSTP

## (undated) DEVICE — CATHETER JEJUSTMY AD 16FR 15CC L108IN THMB VLV FOR DCOMPR

## (undated) DEVICE — FORCEPS BX OVL CUP SERR DISP CAP L 240CM RAD JAW 4

## (undated) DEVICE — KENDALL SCD EXPRESS SLEEVES, KNEE LENGTH, MEDIUM: Brand: KENDALL SCD

## (undated) DEVICE — SHEAR HARMONIC 5MMX45CM -- ACE 7+

## (undated) DEVICE — GOWN,SIRUS,NONRNF,SETINSLV,XL,20/CS: Brand: MEDLINE

## (undated) DEVICE — REM POLYHESIVE ADULT PATIENT RETURN ELECTRODE: Brand: VALLEYLAB

## (undated) DEVICE — NEEDLE INSUF L150MM DIA2MM DISP FOR PNEUMOPERI ENDOPATH

## (undated) DEVICE — TROCAR LAP L100MM DIA5MM BLDELSS W/ STBL SL ENDOPATH XCEL

## (undated) DEVICE — VISUALIZATION SYSTEM: Brand: CLEARIFY